# Patient Record
Sex: FEMALE | Race: WHITE | NOT HISPANIC OR LATINO | Employment: FULL TIME | URBAN - METROPOLITAN AREA
[De-identification: names, ages, dates, MRNs, and addresses within clinical notes are randomized per-mention and may not be internally consistent; named-entity substitution may affect disease eponyms.]

---

## 2017-05-17 ENCOUNTER — TELEPHONE (OUTPATIENT)
Dept: NURSING | Facility: CLINIC | Age: 32
End: 2017-05-17

## 2017-05-17 DIAGNOSIS — Z30.41 ENCOUNTER FOR SURVEILLANCE OF CONTRACEPTIVE PILLS: Primary | ICD-10-CM

## 2017-05-17 NOTE — TELEPHONE ENCOUNTER
Received request for refill of Norethindrone 0.35 mg tablet. No record of medication under pt's chart.   To be sent to [facility] pharmacy [id number] [address]. Routed to Анна Ramírez. Please advise

## 2017-05-18 RX ORDER — ACETAMINOPHEN AND CODEINE PHOSPHATE 120; 12 MG/5ML; MG/5ML
1 SOLUTION ORAL DAILY
Qty: 84 TABLET | Refills: 3 | Status: SHIPPED | OUTPATIENT
Start: 2017-05-18 | End: 2023-11-07

## 2023-05-24 ENCOUNTER — LAB REQUISITION (OUTPATIENT)
Dept: LAB | Facility: CLINIC | Age: 38
End: 2023-05-24

## 2023-05-24 DIAGNOSIS — Z01.419 ENCOUNTER FOR GYNECOLOGICAL EXAMINATION (GENERAL) (ROUTINE) WITHOUT ABNORMAL FINDINGS: ICD-10-CM

## 2023-05-24 PROCEDURE — G0145 SCR C/V CYTO,THINLAYER,RESCR: HCPCS | Performed by: OBSTETRICS & GYNECOLOGY

## 2023-05-24 PROCEDURE — 87624 HPV HI-RISK TYP POOLED RSLT: CPT | Performed by: OBSTETRICS & GYNECOLOGY

## 2023-05-30 LAB
BKR LAB AP GYN ADEQUACY: NORMAL
BKR LAB AP GYN INTERPRETATION: NORMAL
BKR LAB AP HPV REFLEX: NORMAL
BKR LAB AP LMP: NORMAL
BKR LAB AP PREVIOUS ABNL DX: NORMAL
BKR LAB AP PREVIOUS ABNORMAL: NORMAL
PATH REPORT.COMMENTS IMP SPEC: NORMAL
PATH REPORT.COMMENTS IMP SPEC: NORMAL
PATH REPORT.RELEVANT HX SPEC: NORMAL

## 2023-06-01 LAB
HUMAN PAPILLOMA VIRUS 16 DNA: NEGATIVE
HUMAN PAPILLOMA VIRUS 18 DNA: NEGATIVE
HUMAN PAPILLOMA VIRUS FINAL DIAGNOSIS: NORMAL
HUMAN PAPILLOMA VIRUS OTHER HR: NEGATIVE

## 2023-09-27 ENCOUNTER — TELEPHONE (OUTPATIENT)
Dept: FAMILY MEDICINE | Facility: CLINIC | Age: 38
End: 2023-09-27
Payer: COMMERCIAL

## 2023-09-27 NOTE — TELEPHONE ENCOUNTER
Patient called having numbness to left side of face for a couple of weeks.  Thought is tooth related. Went to the dentist yesterday and was told it is not teeth related but a nerve related.  Patient reported   - no recent dental work done that required sedation.   - no known injury  - pain affects jaw and left ear    Patient has no primary care provider currently.  Will be seeing Dr. Riggs to establish care with appointment scheduled for 11/7/23.      RN advised go to urgent care to have this issue address today.  Patient agreed.  Urgent care and hours of operation provided at the [place] location.        DERREK Cuellar, RN  [facility]

## 2023-10-17 ENCOUNTER — TELEPHONE (OUTPATIENT)
Dept: NEUROSURGERY | Facility: CLINIC | Age: 38
End: 2023-10-17
Payer: COMMERCIAL

## 2023-10-17 NOTE — TELEPHONE ENCOUNTER
Records Requested     October 17, 2023 1:59 PM   [id number]   Facility  [facility]   Outcome 2:02 pm Sent request for imaging to be pushed to PACS. -CHAN Judge on 10/19/2023 at 8:08 AM Attempted to call & submitted 2nd request for imaging. -CHAN Judge on 10/20/2023 at 8:18 AM Imaging resolved into PACS. -CHAN     RECORDS RECEIVED FROM: Care Everywhere   REASON FOR VISIT: Facial Pain   Date of Appt: 10/20/23 @ 9:00 am    NOTES (FOR ALL VISITS) STATUS DETAILS   OFFICE NOTE from referring provider Internal  10/17/23 (Phone Note)    OFFICE NOTE from other specialist Care Everywhere 10/6/23 Spencer Stephen @[facility]    9/27/23 Eleno Whyte @[facility]    9/27/23 (Phone Appt) Mayra Berman MD @[facility]     MEDICATION LIST Internal     IMAGING  (FOR ALL VISITS)     MRI (HEAD, NECK, SPINE) PACS   10/9/23  MR Brain

## 2023-10-17 NOTE — TELEPHONE ENCOUNTER
Health Call Center    Phone Message    May a detailed message be left on voicemail: yes     Reason for Call: Other: Patient called stating she missed a call to schedule with KEO Vale CNP, she states she has been emailing Dr. Trivedi personally. Patient requesting a call back to schedule. Writer unable to find encounter or instructions. Patient can be reached at [phone].     Action Taken: Message routed to:  [facility] Neurosurgery    Travel Screening: Not Applicable

## 2023-10-20 ENCOUNTER — OFFICE VISIT (OUTPATIENT)
Dept: NEUROSURGERY | Facility: CLINIC | Age: 38
End: 2023-10-20
Payer: COMMERCIAL

## 2023-10-20 ENCOUNTER — PRE VISIT (OUTPATIENT)
Dept: NEUROSURGERY | Facility: CLINIC | Age: 38
End: 2023-10-20

## 2023-10-20 VITALS
RESPIRATION RATE: 16 BRPM | OXYGEN SATURATION: 100 % | HEART RATE: 72 BPM | WEIGHT: 182.4 LBS | SYSTOLIC BLOOD PRESSURE: 134 MMHG | DIASTOLIC BLOOD PRESSURE: 78 MMHG

## 2023-10-20 DIAGNOSIS — G50.0 TRIGEMINAL NEURALGIA: Primary | ICD-10-CM

## 2023-10-20 PROCEDURE — 99203 OFFICE O/P NEW LOW 30 MIN: CPT | Performed by: NURSE PRACTITIONER

## 2023-10-20 RX ORDER — CARBAMAZEPINE 100 MG/1
CAPSULE, EXTENDED RELEASE ORAL
Qty: 60 CAPSULE | Refills: 1 | Status: SHIPPED | OUTPATIENT
Start: 2023-10-20 | End: 2023-11-06

## 2023-10-20 NOTE — LETTER
"10/20/2023       RE: Christelle Bob  [address] [place] [address]       Dear Colleague,    Thank you for referring your patient, Christelle Bob, to the [facility] [place] at [facility]. Please see a copy of my visit note below.    [facility]  Department of Neurosurgery      Name: Christelle Bob  MRN: [id number]  Age: 38 year old  : 1985  Referring provider: No ref. provider found  10/20/2023      Chief Complaint:   Left Facial Pain and numbness  New Patient    History of Present Illness:   Christelle Bob is a 38 year old female with a history of Raynaud's disease, who is here today for further evaluation of left sided facial pain and numbness. She is accompanied by her , Werner.     Per patient, her symptoms acutely started 5 weeks ago. She started with pain in her left cheek area along with some inflammation in gums. Then a few days later, she started with numbness/ \"pressure feeling\" in her left chin, left lower lip and half of her tongue on the left. She was evaluated by a dentist who ruled out dental pathology.     Today, patient reports a \"constant, dull achy pain along with intermittent, sharp, shooting pain on the left V2-3 distribution. Sometimes she feels this pain deep in her ear. No pain with swallowing. Symptoms triggered by cool wind. Numbness is somewhat constant and she reports about 50-75 % reduction in sensation when she touches her left chin, left lower lip. No issues with speech or swallowing. She denies any other neurological symptoms.    She was evaluated in Urgent Care and also by her PCP. She was treated with Valtrex and Prednisone which did not make any changes to her symptoms. Lyme testing was negative. Slightly elevated ESR and a normal CRP.     She denies any known diagnosis of TMJ. No grinding teeth or popping/ clicking with mouth opening and closing. She had Invisalign in " 2020-21 and currently uses a retainer at night.     She has a h/o chicken pox at the age of 4 and Shingles (Left chest area) at the age of 6. Unsure whether she had Shingles vaccine.     No known family h/o MS or previous clinical symptoms concerning for MS relapse.     She completed a brain MRI recently. Please see the results below.       Review of Systems:   Pertinent items are noted in HPI or as in patient entered ROS below, remainder of complete ROS is negative.        No data to display                 Active Medications:     Current Outpatient Medications:     norethindrone (MICRONOR) 0.35 MG per tablet, Take 1 tablet (0.35 mg) by mouth daily, Disp: 84 tablet, Rfl: 3    Prenatal Vit-Fe Fumarate-FA (PRENATAL MULTIVITAMIN  PLUS IRON) 27-0.8 MG TABS, Take 1 tablet by mouth daily, Disp: , Rfl:       Allergies:   Patient has no known allergies.      Past Medical History:  No past medical history on file.  Patient Active Problem List   Diagnosis    Indication for care in labor or delivery    Vaginal abnormality in pregnancy        Past Surgical History:  No past surgical history on file.    Family History:   No family history on file.      Social History:   Social History     Tobacco Use    Smoking status: Never   Substance Use Topics    Alcohol use: No    Drug use: No        Physical Exam:   /78 (BP Location: Right arm, Patient Position: Sitting, Cuff Size: Adult Regular)   Pulse 72   Resp 16   Wt 82.7 kg (182 lb 6.4 oz)   SpO2 100%    General: No acute distress.   Neuro: The patient is fully oriented. Speech is normal. Extraocular movements are intact without nystagmus. Subjective report of reduced sensation to light touch in left V2-3 distribution.  Facial nerve function is normal, rated as a House Brackmann 1. Shoulders are full strength. There is no pronator drift. Full strength in all extremities. Sensation intact throughout. No dysmetria with finger-nose-finger testing. Gait is normal with normal  heel-toe walking.   Psych: Normal mood and affect. Behavior is normal.      Imaging:  10/9/2023 MRI Brain:  IMPRESSION:   1. No abnormality identified along the course of either trigeminal nerve.   2.  Unremarkable head MRI.      Assessment:  Left facial pain and numbness  New Patient    Plan:  We discussed possible diagnosis of Trigeminal neuralgia although her presentation is somewhat atypical. We will start her on Carbamazepine 100 mg daily and gradually increase to 200-300 mg twice daily. Prescription sent to pharmacy. Patient to follow-up with me in 4 weeks. Sooner if she has questions or concerns.     Will review MRI Brain with Dr. Trivedi and will contact the patient.     I spent 35 minutes on patient care activities related to this encounter on the date of service, including time spent reviewing the chart, obtaining history and examination and in counseling the patient, and in documentation in the electronic medical record.          Again, thank you for allowing me to participate in the care of your patient.      Sincerely,    KEO Knight CNP

## 2023-10-20 NOTE — PATIENT INSTRUCTIONS
Start Carbamazepine as instructed.     Follow-up with me in 4 weeks. Sooner if you have questions or concerns.

## 2023-10-20 NOTE — NURSING NOTE
Chief Complaint   Patient presents with    New Patient     Here for consult, confirmed with patient     Lindy Sheehan

## 2023-10-20 NOTE — PROGRESS NOTES
"[facility]  Department of Neurosurgery      Name: Christelle Bob  MRN: [id number]  Age: 38 year old  : 1985  Referring provider: No ref. provider found  10/20/2023      Chief Complaint:   Left Facial Pain and numbness  New Patient    History of Present Illness:   Christelle Bob is a 38 year old female with a history of Raynaud's disease, who is here today for further evaluation of left sided facial pain and numbness. She is accompanied by her , Werner.     Per patient, her symptoms acutely started 5 weeks ago. She started with pain in her left cheek area along with some inflammation in gums. Then a few days later, she started with numbness/ \"pressure feeling\" in her left chin, left lower lip and half of her tongue on the left. She was evaluated by a dentist who ruled out dental pathology.     Today, patient reports a \"constant, dull achy pain along with intermittent, sharp, shooting pain on the left V2-3 distribution. Sometimes she feels this pain deep in her ear. No pain with swallowing. Symptoms triggered by cool wind. Numbness is somewhat constant and she reports about 50-75 % reduction in sensation when she touches her left chin, left lower lip. No issues with speech or swallowing. She denies any other neurological symptoms.    She was evaluated in Urgent Care and also by her PCP. She was treated with Valtrex and Prednisone which did not make any changes to her symptoms. Lyme testing was negative. Slightly elevated ESR and a normal CRP.     She denies any known diagnosis of TMJ. No grinding teeth or popping/ clicking with mouth opening and closing. She had Invisalign in  and currently uses a retainer at night.     She has a h/o chicken pox at the age of 4 and Shingles (Left chest area) at the age of 6. Unsure whether she had Shingles vaccine.     No known family h/o MS or previous clinical symptoms concerning for MS relapse.     She completed a brain MRI recently. Please see " the results below.       Review of Systems:   Pertinent items are noted in HPI or as in patient entered ROS below, remainder of complete ROS is negative.        No data to display                 Active Medications:     Current Outpatient Medications:     norethindrone (MICRONOR) 0.35 MG per tablet, Take 1 tablet (0.35 mg) by mouth daily, Disp: 84 tablet, Rfl: 3    Prenatal Vit-Fe Fumarate-FA (PRENATAL MULTIVITAMIN  PLUS IRON) 27-0.8 MG TABS, Take 1 tablet by mouth daily, Disp: , Rfl:       Allergies:   Patient has no known allergies.      Past Medical History:  No past medical history on file.  Patient Active Problem List   Diagnosis    Indication for care in labor or delivery    Vaginal abnormality in pregnancy        Past Surgical History:  No past surgical history on file.    Family History:   No family history on file.      Social History:   Social History     Tobacco Use    Smoking status: Never   Substance Use Topics    Alcohol use: No    Drug use: No        Physical Exam:   /78 (BP Location: Right arm, Patient Position: Sitting, Cuff Size: Adult Regular)   Pulse 72   Resp 16   Wt 82.7 kg (182 lb 6.4 oz)   SpO2 100%    General: No acute distress.   Neuro: The patient is fully oriented. Speech is normal. Extraocular movements are intact without nystagmus. Subjective report of reduced sensation to light touch in left V2-3 distribution.  Facial nerve function is normal, rated as a House Brackmann 1. Shoulders are full strength. There is no pronator drift. Full strength in all extremities. Sensation intact throughout. No dysmetria with finger-nose-finger testing. Gait is normal with normal heel-toe walking.   Psych: Normal mood and affect. Behavior is normal.      Imaging:  10/9/2023 MRI Brain:  IMPRESSION:   1. No abnormality identified along the course of either trigeminal nerve.   2.  Unremarkable head MRI.      Assessment:  Left facial pain and numbness  New Patient    Plan:  We discussed possible  diagnosis of Trigeminal neuralgia although her presentation is somewhat atypical. We will start her on Carbamazepine 100 mg daily and gradually increase to 200-300 mg twice daily. Prescription sent to pharmacy. Patient to follow-up with me in 4 weeks. Sooner if she has questions or concerns.     Will review MRI Brain with Dr. Trivedi and will contact the patient.     Addendum on 10/24/2023: Discussed and MRI brain was reviewed by Dr. Trivedi. It is not clear whether there is any vascular compression on the current MRI. Recommended to repeat MRI Brain with FIESTA sequence. Order placed. Will update the patient with this recommendation.     Addendum on 11/7/2023: I called the patient to follow-up on her recent [employer] message.  Per patient, rashes are getting better but she has itching.  She continues to be on Benadryl.    She reports improvement in her facial pain while she was taking carbamazepine.  We recommended her to stop this medication due to recent onset of whole body rash, possibly from carbamazepine.  Patient reports increased symptoms after stopping this medication.    Her recent brain MRI was reviewed by Dr. Trivedi.  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  We will arrange a clinic visit with Dr. Trivedi to discuss possible options.    We also discussed starting a trial with gabapentin for ongoing facial pain and numbness.  We will start her on 300 mg daily and will gradually increase to 300 mg 3 times a day.    Sharon Stratton CNP  Department of Neurosurgery    I spent 35 minutes on patient care activities related to this encounter on the date of service, including time spent reviewing the chart, obtaining history and examination and in counseling the patient, and in documentation in the electronic medical record.

## 2023-10-24 ENCOUNTER — TELEPHONE (OUTPATIENT)
Dept: NEUROSURGERY | Facility: CLINIC | Age: 38
End: 2023-10-24
Payer: COMMERCIAL

## 2023-10-31 ENCOUNTER — ANCILLARY PROCEDURE (OUTPATIENT)
Dept: MRI IMAGING | Facility: CLINIC | Age: 38
End: 2023-10-31
Attending: NURSE PRACTITIONER
Payer: COMMERCIAL

## 2023-10-31 DIAGNOSIS — G50.0 TRIGEMINAL NEURALGIA: ICD-10-CM

## 2023-10-31 RX ORDER — GADOBUTROL 604.72 MG/ML
8.3 INJECTION INTRAVENOUS ONCE
Status: COMPLETED | OUTPATIENT
Start: 2023-10-31 | End: 2023-10-31

## 2023-10-31 RX ADMIN — GADOBUTROL 8.3 ML: 604.72 INJECTION INTRAVENOUS at 14:39

## 2023-11-04 ENCOUNTER — MYC MEDICAL ADVICE (OUTPATIENT)
Dept: NEUROSURGERY | Facility: CLINIC | Age: 38
End: 2023-11-04
Payer: COMMERCIAL

## 2023-11-06 ASSESSMENT — ENCOUNTER SYMPTOMS
JOINT SWELLING: 0
ABDOMINAL PAIN: 0
ARTHRALGIAS: 0
COUGH: 0
SORE THROAT: 0
WEAKNESS: 0
HEADACHES: 0
HEMATOCHEZIA: 0
PALPITATIONS: 0
FREQUENCY: 0
MYALGIAS: 0
NERVOUS/ANXIOUS: 0
HEARTBURN: 0
CHILLS: 0
EYE PAIN: 0
SHORTNESS OF BREATH: 0
DIARRHEA: 0
CONSTIPATION: 0
FEVER: 0
PARESTHESIAS: 0
NAUSEA: 1
HEMATURIA: 0
DIZZINESS: 0
BREAST MASS: 0
DYSURIA: 0

## 2023-11-06 NOTE — TELEPHONE ENCOUNTER
Spoke with Christelle.  Patient started to have red, hot hives from neck to toes on 11/4/23 after starting Carbamazepine for facial pain.    Medication was stopped yesterday, 11/5.    Patient denies any facial swelling, mouth, throat or lip swelling, drinking and eating without issues  Patient taking Benadryl for itchy red, hot feeling hives.  Patient has appointment with PCP tomorrow, patient aware any facial or throat swelling to get to ED or 911.    Dr Trivedi to review MRI and patient will be called with results. Voices understanding

## 2023-11-06 NOTE — TELEPHONE ENCOUNTER
Hi,     Please call and check on her. I think it is better to stop Carbamazepine as she has this rash now. Will have to talk to Dr. Trivedi and review her recent MRI.     Thanks,   Sharon

## 2023-11-07 ENCOUNTER — OFFICE VISIT (OUTPATIENT)
Dept: FAMILY MEDICINE | Facility: CLINIC | Age: 38
End: 2023-11-07
Payer: COMMERCIAL

## 2023-11-07 ENCOUNTER — TELEPHONE (OUTPATIENT)
Dept: NEUROSURGERY | Facility: CLINIC | Age: 38
End: 2023-11-07

## 2023-11-07 VITALS
DIASTOLIC BLOOD PRESSURE: 72 MMHG | HEIGHT: 70 IN | HEART RATE: 92 BPM | BODY MASS INDEX: 25.77 KG/M2 | WEIGHT: 180 LBS | RESPIRATION RATE: 16 BRPM | OXYGEN SATURATION: 99 % | TEMPERATURE: 97.2 F | SYSTOLIC BLOOD PRESSURE: 108 MMHG

## 2023-11-07 DIAGNOSIS — Z11.59 NEED FOR HEPATITIS C SCREENING TEST: ICD-10-CM

## 2023-11-07 DIAGNOSIS — G50.0 TRIGEMINAL NEURALGIA: Primary | ICD-10-CM

## 2023-11-07 DIAGNOSIS — Z13.220 LIPID SCREENING: ICD-10-CM

## 2023-11-07 DIAGNOSIS — Z00.00 ROUTINE GENERAL MEDICAL EXAMINATION AT A HEALTH CARE FACILITY: Primary | ICD-10-CM

## 2023-11-07 DIAGNOSIS — Z11.4 SCREENING FOR HIV (HUMAN IMMUNODEFICIENCY VIRUS): ICD-10-CM

## 2023-11-07 LAB
CHOLEST SERPL-MCNC: 175 MG/DL
HCV AB SERPL QL IA: NONREACTIVE
HDLC SERPL-MCNC: 31 MG/DL
HIV 1+2 AB+HIV1 P24 AG SERPL QL IA: NONREACTIVE
LDLC SERPL CALC-MCNC: 113 MG/DL
NONHDLC SERPL-MCNC: 144 MG/DL
TRIGL SERPL-MCNC: 155 MG/DL

## 2023-11-07 PROCEDURE — 36415 COLL VENOUS BLD VENIPUNCTURE: CPT | Performed by: FAMILY MEDICINE

## 2023-11-07 PROCEDURE — 99386 PREV VISIT NEW AGE 40-64: CPT | Performed by: FAMILY MEDICINE

## 2023-11-07 PROCEDURE — 87389 HIV-1 AG W/HIV-1&-2 AB AG IA: CPT | Performed by: FAMILY MEDICINE

## 2023-11-07 PROCEDURE — 80061 LIPID PANEL: CPT | Performed by: FAMILY MEDICINE

## 2023-11-07 PROCEDURE — 86803 HEPATITIS C AB TEST: CPT | Performed by: FAMILY MEDICINE

## 2023-11-07 RX ORDER — GABAPENTIN 300 MG/1
CAPSULE ORAL
Qty: 90 CAPSULE | Refills: 1 | Status: SHIPPED | OUTPATIENT
Start: 2023-11-07

## 2023-11-07 ASSESSMENT — ENCOUNTER SYMPTOMS
FREQUENCY: 0
HEMATOCHEZIA: 0
JOINT SWELLING: 0
WEAKNESS: 0
EYE PAIN: 0
PALPITATIONS: 0
SORE THROAT: 0
MYALGIAS: 0
CHILLS: 0
DYSURIA: 0
COUGH: 0
CONSTIPATION: 0
NERVOUS/ANXIOUS: 0
HEARTBURN: 0
DIZZINESS: 0
ARTHRALGIAS: 0
DIARRHEA: 0
HEMATURIA: 0
ABDOMINAL PAIN: 0
NAUSEA: 1
FEVER: 0
SHORTNESS OF BREATH: 0
HEADACHES: 0
BREAST MASS: 0
PARESTHESIAS: 0

## 2023-11-07 ASSESSMENT — PAIN SCALES - GENERAL: PAINLEVEL: SEVERE PAIN (6)

## 2023-11-07 NOTE — PROGRESS NOTES
SUBJECTIVE:   CC: Christelle is an 38 year old who presents for preventive health visit.       2023     8:24 AM   Additional Questions   Roomed by Asmita HOOD     Healthy Habits:     Getting at least 3 servings of Calcium per day:  Yes    Bi-annual eye exam:  NO    Dental care twice a year:  Yes    Sleep apnea or symptoms of sleep apnea:  None    Diet:  Regular (no restrictions)    Frequency of exercise:  2-3 days/week    Duration of exercise:  15-30 minutes    Taking medications regularly:  Yes    Medication side effects:  Other    Additional concerns today:  Yes    Today's PHQ-2 Score:       2023     7:39 PM   PHQ-2 (  [employer])   Q1: Little interest or pleasure in doing things 1   Q2: Feeling down, depressed or hopeless 1   PHQ-2 Score 2   Q1: Little interest or pleasure in doing things Several days   Q2: Feeling down, depressed or hopeless Several days   PHQ-2 Score 2         Have you ever done Advance Care Planning? (For example, a Health Directive, POLST, or a discussion with a medical provider or your loved ones about your wishes): Yes, patient states has an Advance Care Planning document and will bring a copy to the clinic.    Social History     Tobacco Use    Smoking status: Never    Smokeless tobacco: Never   Substance Use Topics    Alcohol use: No             2023     7:39 PM   Alcohol Use   Prescreen: >3 drinks/day or >7 drinks/week? No          No data to display              Reviewed orders with patient.  Reviewed health maintenance and updated orders accordingly - Yes      Breast Cancer Screenin/6/2023     7:40 PM   Breast CA Risk Assessment (FHS-7)   Do you have a family history of breast, colon, or ovarian cancer? No / Unknown           Pertinent mammograms are reviewed under the imaging tab.    History of abnormal Pap smear: NO - age 30-65 PAP every 5 years with negative HPV co-testing recommended      Latest Ref Rng & Units 2023     9:57 AM   PAP / HPV   PAP  Negative  "for Intraepithelial Lesion or Malignancy (NILM)    HPV 16 DNA Negative Negative    HPV 18 DNA Negative Negative    Other HR HPV Negative Negative      Reviewed and updated as needed this visit by clinical staff   Tobacco  Allergies  Meds    Surg Hx  Fam Hx  Soc Hx        Reviewed and updated as needed this visit by Provider   Tobacco   Meds    Surg Hx  Fam Hx  Soc Hx           Review of Systems   Constitutional:  Negative for chills and fever.   HENT:  Negative for congestion, ear pain, hearing loss and sore throat.    Eyes:  Negative for pain and visual disturbance.   Respiratory:  Negative for cough and shortness of breath.    Cardiovascular:  Negative for chest pain, palpitations and peripheral edema.   Gastrointestinal:  Positive for nausea. Negative for abdominal pain, constipation, diarrhea, heartburn and hematochezia.   Breasts:  Negative for tenderness, breast mass and discharge.   Genitourinary:  Positive for vaginal discharge. Negative for dysuria, frequency, genital sores, hematuria, pelvic pain, urgency and vaginal bleeding.   Musculoskeletal:  Negative for arthralgias, joint swelling and myalgias.   Skin:  Positive for rash.   Neurological:  Negative for dizziness, weakness, headaches and paresthesias.   Psychiatric/Behavioral:  Negative for mood changes. The patient is not nervous/anxious.           OBJECTIVE:   /72 (BP Location: Right arm, Patient Position: Sitting, Cuff Size: Adult Regular)   Pulse 92   Temp 97.2  F (36.2  C) (Temporal)   Resp 16   Ht 1.778 m (5' 10\")   Wt 81.6 kg (180 lb)   LMP 10/18/2023 (Exact Date)   SpO2 99%   Breastfeeding No   BMI 25.83 kg/m    Physical Exam  GENERAL: healthy, alert and no distress  EYES: Eyes grossly normal to inspection, PERRL and conjunctivae and sclerae normal  HENT: nose and mouth without ulcers or lesions  NECK: no adenopathy, no asymmetry, masses, or scars and thyroid normal to palpation  RESP: lungs clear to auscultation - no " "rales, rhonchi or wheezes  CV: regular rate and rhythm, normal S1 S2, no S3 or S4, no murmur, click or rub, no peripheral edema and peripheral pulses strong  ABDOMEN: soft, nontender, no hepatosplenomegaly, no masses and bowel sounds normal  MS: no gross musculoskeletal defects noted, no edema  SKIN: no suspicious lesions  NEURO: Normal strength and tone, mentation intact and speech normal  PSYCH: mentation appears normal, affect normal/bright        ASSESSMENT/PLAN:   Christelle was seen today for physical and establish care.    Diagnoses and all orders for this visit:    Routine general medical examination at a health care facility  -     PRIMARY CARE FOLLOW-UP SCHEDULING; Future    Lipid screening  -     Lipid panel reflex to direct LDL Non-fasting    Screening for HIV (human immunodeficiency virus)  -     HIV Antigen Antibody Combo    Need for hepatitis C screening test  -     Hepatitis C Screen Reflex to HCV RNA Quant and Genotype              COUNSELING:  Reviewed preventive health counseling, as reflected in patient instructions       Regular exercise       Healthy diet/nutrition      BMI:   Estimated body mass index is 25.83 kg/m  as calculated from the following:    Height as of this encounter: 1.778 m (5' 10\").    Weight as of this encounter: 81.6 kg (180 lb).         She reports that she has never smoked. She has never used smokeless tobacco.    Moe Riggs DO  [facility]  "

## 2023-11-07 NOTE — TELEPHONE ENCOUNTER
I called the patient to follow-up on her recent [email or web address] message.  Per patient, rashes are getting better but she has itching.  She continues to be on Benadryl.    She reports improvement in her facial pain while she was taking carbamazepine.  We recommended her to stop this medication due to recent onset of whole body rash, possibly from carbamazepine.  Patient reports increased symptoms after stopping this medication.    Her recent brain MRI was reviewed by Dr. Trivedi.  There is neurovascular compression by a loop of SCA and round left-sided trigeminal nerve.  We will arrange a clinic visit with Dr. Trivedi to discuss possible options.    We also discussed starting a trial with gabapentin for ongoing symptoms.  We will start her on 300 mg daily and will gradually increase to 300 mg 3 times a day.

## 2023-11-08 ENCOUNTER — TELEPHONE (OUTPATIENT)
Dept: NEUROSURGERY | Facility: CLINIC | Age: 38
End: 2023-11-08
Payer: COMMERCIAL

## 2023-11-08 NOTE — TELEPHONE ENCOUNTER
RECORDS RECEIVED FROM: Care Everywhere   REASON FOR VISIT: Trigeminal Neuralgia   Date of Appt: 11/14/23 @ 3:00 pm    NOTES (FOR ALL VISITS) STATUS DETAILS   OFFICE NOTE from referring provider Internal 10/20/23, 10/17/23 (Phone Note) Sharon Stratton APRN CNP @[facility]-NeuroSurg     OFFICE NOTE from other specialist Care Everywhere 10/6/23 Spencer Stephen @[facility]     9/27/23 Eleno Whyte @[facility]     9/27/23 (Phone Appt) Mayra Berman MD @[facility]      MEDICATION LIST Internal    IMAGING  (FOR ALL VISITS)     MRI (HEAD, NECK, SPINE) Internal [facility]  10/31/23 MR Brain      10/9/23  MR Brain

## 2023-11-13 NOTE — TELEPHONE ENCOUNTER
Attn: JOSE Mosquera   *Patient reports rash, photos attached. States from medication     Mayra Storey LPN  Neurosurgery

## 2023-11-14 ENCOUNTER — VIRTUAL VISIT (OUTPATIENT)
Dept: NEUROSURGERY | Facility: CLINIC | Age: 38
End: 2023-11-14
Payer: COMMERCIAL

## 2023-11-14 ENCOUNTER — PRE VISIT (OUTPATIENT)
Dept: NEUROSURGERY | Facility: CLINIC | Age: 38
End: 2023-11-14

## 2023-11-14 DIAGNOSIS — G50.0 TRIGEMINAL NEURALGIA: Primary | ICD-10-CM

## 2023-11-14 PROCEDURE — 99214 OFFICE O/P EST MOD 30 MIN: CPT | Mod: 95 | Performed by: NEUROLOGICAL SURGERY

## 2023-11-14 ASSESSMENT — PAIN SCALES - GENERAL: PAINLEVEL: MODERATE PAIN (4)

## 2023-11-14 NOTE — PROGRESS NOTES
Virtual Visit Details    Type of service:  Video Visit     I had a very nice call today with Colleen and her  Werner.  They gave consent for this visit.    Colleen is a 38-year-old woman from [place] who works at [employer].  She was referred to me from a family friend due to her facial pain.  She has been seen and managed in our clinic.  We have diagnosed her with left-sided trigeminal neuralgia.  She was started on Tegretol and this was very effective for her pain however she developed a rash.  She was then transitioned to gabapentin and interestingly enough developed a rash with that as well.    Today I talked her about the history of her pain.  This pain came on earlier this year and was associated with numbness and tingling.  She describes sharp shooting electrical pain that she describes as zingers.  There is a constant dull achy component of the pain that is present almost all the time.  She says the Tegretol was beneficial for both components of the pain.    We obtained a high-resolution MRI which I reviewed with her today.  I do see a loop of the superior cerebellar artery that is in contact with the left trigeminal nerve.    Overall I do think that she is having trigeminal neuralgia.  This is supported by the sharp shooting electrical nature of the pain, unilateral, triggered by cold, response to Tegretol.  It is a little unusual to have the numbness and tingling which is associated with her pain.  Because of this it does cause me to pause and ensure that this is trigeminal neuralgia before we proceed with any surgical interventions.    I also found it interesting that she had a rash with gabapentin.  Usually I have not encountered this.  I have asked and master in my office to reach out to Maria Luisa Barone to ask Maria Luisa to call Marisol and to talk to her about the medications.  I am curious if Maria Luisa can help us identify a medication that works well without any side effects.    Before  proceeding with any surgical interventions I would like Colleen to be seen in our multidisciplinary facial pain clinic.    Today I spelled out what I see as our path moving forward.  She will be seen in our multidisciplinary clinic.  I also hope that we can get her on a medication in the meantime.  If the medications are not helping and my colleagues do agree that this is trigeminal neuralgia I would then consider a microvascular decompression over any of the ablative procedures.  Colleen and her  Werner asked a number of good questions and seemed to understand the situation well and are comfortable with our plan.

## 2023-11-14 NOTE — NURSING NOTE
Is the patient currently in the state of [place]? YES    Visit mode:VIDEO    If the visit is dropped, the patient can be reconnected by: VIDEO VISIT: Text to cell phone:   Telephone Information:   Mobile [phone]    and VIDEO VISIT: Send to e-mail at: [email or web address]    Will anyone else be joining the visit? YES: How would they like to receive their invitation? Send to e-mail: [email or web address]  (If patient encounters technical issues they should call [phone])    How would you like to obtain your AVS? MyChart    Are changes needed to the allergy or medication list? Yes gabapentin allergic reaction: hives    Reason for visit: Consult    Amanda ROBINS

## 2023-11-14 NOTE — LETTER
11/14/2023       RE: Christelle Bob  [address]  [place] [address]     Dear Colleague,    Thank you for referring your patient, Christelle Bob, to the [facility] NEUROSURGERY CLINIC [place] at [facility]. Please see a copy of my visit note below.    Virtual Visit Details    Type of service:  Video Visit     I had a very nice call today with Colleen and her  Werner.  They gave consent for this visit.    Colleen is a 38-year-old woman from [place] who works at [employer].  She was referred to me from a family friend due to her facial pain.  She has been seen and managed in our clinic.  We have diagnosed her with left-sided trigeminal neuralgia.  She was started on Tegretol and this was very effective for her pain however she developed a rash.  She was then transitioned to gabapentin and interestingly enough developed a rash with that as well.    Today I talked her about the history of her pain.  This pain came on earlier this year and was associated with numbness and tingling.  She describes sharp shooting electrical pain that she describes as zingers.  There is a constant dull achy component of the pain that is present almost all the time.  She says the Tegretol was beneficial for both components of the pain.    We obtained a high-resolution MRI which I reviewed with her today.  I do see a loop of the superior cerebellar artery that is in contact with the left trigeminal nerve.    Overall I do think that she is having trigeminal neuralgia.  This is supported by the sharp shooting electrical nature of the pain, unilateral, triggered by cold, response to Tegretol.  It is a little unusual to have the numbness and tingling which is associated with her pain.  Because of this it does cause me to pause and ensure that this is trigeminal neuralgia before we proceed with any surgical interventions.    I also found it interesting that she had a  rash with gabapentin.  Usually I have not encountered this.  I have asked and master in my office to reach out to Maria Luisa Abisai to ask Maria Luisa to call Marisol and to talk to her about the medications.  I am curious if Maria Luisa can help us identify a medication that works well without any side effects.    Before proceeding with any surgical interventions I would like Colleen to be seen in our multidisciplinary facial pain clinic.    Today I spelled out what I see as our path moving forward.  She will be seen in our multidisciplinary clinic.  I also hope that we can get her on a medication in the meantime.  If the medications are not helping and my colleagues do agree that this is trigeminal neuralgia I would then consider a microvascular decompression over any of the ablative procedures.  Colleen and her  Werner asked a number of good questions and seemed to understand the situation well and are comfortable with our plan.        Again, thank you for allowing me to participate in the care of your patient.      Sincerely,    Jered Trivedi MD

## 2023-11-15 ENCOUNTER — TELEPHONE (OUTPATIENT)
Dept: OTOLARYNGOLOGY | Facility: CLINIC | Age: 38
End: 2023-11-15
Payer: COMMERCIAL

## 2023-11-15 NOTE — PATIENT INSTRUCTIONS
Referral to the Complex Facial Pain Clinic.  Clinic is over Zoom this Monday late afternoon, you will be called with an appointment time/date.    Referral to Annette Barone, Pharmacy to discuss medications/rash reactions.    Follow up with Dr Trivedi in 4 weeks in clinic.    Call Charline MENJIVAR  Neurosurgery Care Coordinator with questions/concerns     Thank you for using [employer]

## 2023-11-15 NOTE — TELEPHONE ENCOUNTER
"[facility], referred by cm Alvarez to overbook per Charline, Patient confirmed virtual Zoom visit, date, and time. Patient stated she would have zoom downloaded and up to date prior to visit. Please send link to \"[email or web address]\" . Shanna 11/15   "

## 2023-11-20 ENCOUNTER — VIRTUAL VISIT (OUTPATIENT)
Dept: OTOLARYNGOLOGY | Facility: CLINIC | Age: 38
End: 2023-11-20
Payer: COMMERCIAL

## 2023-11-20 DIAGNOSIS — R51.9 FACIAL PAIN: Primary | ICD-10-CM

## 2023-11-20 PROCEDURE — 99214 OFFICE O/P EST MOD 30 MIN: CPT | Mod: 95 | Performed by: NEUROLOGICAL SURGERY

## 2023-11-20 RX ORDER — DULOXETIN HYDROCHLORIDE 30 MG/1
CAPSULE, DELAYED RELEASE ORAL
Qty: 30 CAPSULE | Refills: 1 | Status: SHIPPED | OUTPATIENT
Start: 2023-11-20 | End: 2023-12-20

## 2023-11-20 ASSESSMENT — PAIN SCALES - GENERAL
PAINLEVEL: MODERATE PAIN (5)

## 2023-11-20 NOTE — NURSING NOTE
Is the patient currently in the state of [place]? YES    Visit mode:VIDEO    If the visit is dropped, the patient can be reconnected by:  Zoom link via email    Will anyone else be joining the visit? NO  (If patient encounters technical issues they should call [phone]    How would you like to obtain your AVS? MyChart    Are changes needed to the allergy or medication list? No, Pt stated no changes to allergies, and Pt stated no med changes    Reason for visit: Consult    Argenis ROBINS

## 2023-11-20 NOTE — PROGRESS NOTES
Virtual Visit Details    Type of service:  Video Visit     Originating Location (pt. Location): Home    Distant Location (provider location):  On-site  Platform used for Video Visit: Zoom (Telehealth)    She endorses having pressure in the left chin, lower half of face left and left tongue and at times pain in deep ear and no pain with swallowing (LEFT cheek pain to the half of the chin). Her tongue is half numb, numbness is permanent; pain was there a week, and after she saw dentisty and felt like the area was recovering after novocaine.  She does describe zingers of pain that come and go, and the last 2 days 5-10 episodes, last Friday was 30. She endorses that cold temperature is a trigger for pain, not with gusts of wind. There is also a  dull constant pain 3-4 always there.    She did develop a rash after a week or so with carbamazepine - oct 23-nov 3, did make pain better  rash with gabapentin - never made it up to the full dose of the gabapentin, noticed no difference  A blood vessel loop was present on MRI - Dr. Trivedi referral    Began after tonsils removed and Dental related? - dentistry did not think it was related   She has a hx of 1 root canal  [facility] Valtrex, thinking related to dormant shingles?   First MRI was negative and referred to neurology,   connected to Dr. Trivedi and referred to Sharon  Friday 3rd started on carbamazepine and went to [facility], and next day covered in hives, so stopped  THEN started on gabapentin the next day hives from the chest down   Dr. Trivedi recommended to see us    Assessment and Plan:  The history of chin numbness is most consistent with a trigeminal neuropathy and the evaluation will be directed towards these etiologies.     Autoimmune labs (KELL, CIELO, Sjogren's panel, RF), consider Lip biopsy for Sjogren's through ENT  Face MRI (NUMB chin syndrome), evaluate for a neoplasm or lesion that could explain left CN V3 symptomatology  For  pain, start with duloxetine and will follow with Sharon Stratton.

## 2023-11-20 NOTE — LETTER
11/20/2023       RE: Christelle Bob  [address] [place] [address]     Dear Colleague,    Thank you for referring your patient, Christelle Bob, to the [facility] EAR NOSE AND THROAT CLINIC [place] at [facility]. Please see a copy of my visit note below.      She endorses having pressure in the left chin, lower half of face left and left tongue and at times pain in deep ear and no pain with swallowing (LEFT cheek pain to the half of the chin). Her tongue is half numb, numbness is permanent; pain was there a week, and after she saw dentisty and felt like the area was recovering after novocaine.  She does describe zingers of pain that come and go, and the last 2 days 5-10 episodes, last Friday was 30. She endorses that cold temperature is a trigger for pain, not with gusts of wind. There is also a  dull constant pain 3-4 always there.    She did develop a rash after a week or so with carbamazepine - oct 23-nov 3, did make pain better  rash with gabapentin - never made it up to the full dose of the gabapentin, noticed no difference  A blood vessel loop was present on MRI - Dr. Trivedi referral    Began after tonsils removed and Dental related? - dentistry did not think it was related   She has a hx of 1 root canal  [facility] Valtrex, thinking related to dormant shingles?   First MRI was negative and referred to neurology,   connected to Dr. Trivedi and referred to Sharon  Friday 3rd started on carbamazepine and went to [facility], and next day covered in hives, so stopped  THEN started on gabapentin the next day hives from the chest down   Dr. Trivedi recommended to see us    Assessment and Plan:  The history of chin numbness is most consistent with a trigeminal neuropathy and the evaluation will be directed towards these etiologies.     Autoimmune labs (KELL, CIELO, Sjogren's panel, RF), consider Lip biopsy for Sjogren's through  ENT  Face MRI (NUMB chin syndrome), evaluate for a neoplasm or lesion that could explain left CN V3 symptomatology  For pain, start with duloxetine and will follow with Sharon Stratton.       Again, thank you for allowing me to participate in the care of your patient.      Sincerely,    Vivi Gilmore MD

## 2023-11-20 NOTE — NURSING NOTE
Is the patient currently in the state of [place]? YES    Visit mode:VIDEO    If the visit is dropped, the patient can be reconnected by:  Zoom link via email    Will anyone else be joining the visit? NO  (If patient encounters technical issues they should call [phone])    How would you like to obtain your AVS? MyChart    Are changes needed to the allergy or medication list? No, Pt stated no changes to allergies, and Pt stated no med changes    Reason for visit: Consult    Argenis ROBINS

## 2023-11-20 NOTE — PROGRESS NOTES
Virtual Visit Details    Type of service:  Video Visit     I saw Christelle in our multidisciplinary clinic.  Our group felt that her pain is most likely neuropathic and not trigeminal neuralgia.  As such our recommendation was to trial different neuropathic medications.  She will work with Aliza Barone and Sharon Stratton on this.

## 2023-11-20 NOTE — LETTER
11/20/2023       RE: Christelle Bob  [address] [place] [address]     Dear Colleague,    Thank you for referring your patient, Christelle Bob, to the [facility] EAR NOSE AND THROAT CLINIC [place] at [facility]. Please see a copy of my visit note below.        Comprehensive Facial Pain Clinic Note      This patient was seen in a multidisciplinary clinic between Neurosurgery, Neurology, Dentistry, Pharmacy, and ENT. Please refer to all notes from the encounter, which are written by several providers.    History of Present Illness  Christelle Bob is a 38 year old woman  Facial pain in mid September. Thought it was dental related but started having numbness and tingling/burning. She saw her dentist who did not think it was dental related. The pain was moving around the mouth and moving into her cheek bones. She went to an urgent care and the physician at  and started valtrex and a steroid. She got an MRI in October that was reported as negative.  She was then referred to Dr. Trivedi.  Started carbemazepine on 10/23 and on 11/3, she noticed a rash that turned into hives. She stopped the tegretol. She then started gabapentin but developed hives again. She has not been on a medication since then due to the rash. She believes the tegretol did improve it transiently.    Left pain from the cheek bone down to the chin to the midline includes tongue and sometimes the roof of the mouth. Sometimes the pain goes into the ear. Small amount of numbness in the chin. The tongue tingles some of the day. The zingers come and go but the numbness is there all the time.     Triggers: cold but not gusts of wind. Also she can touch her face.   Cold weather makes it burn.        Allergies   Allergen Reactions    Carbamazepine Hives     11/16/23 Hives from neck to toes, itchy red hot.        Current Outpatient Medications   Medication    gabapentin (NEURONTIN) 300 MG capsule      No current facility-administered medications for this visit.       ROS: 10 point ROS neg other than the symptoms noted above in the HPI.        Imaging: my interpretations only  MRI skull base: 10/31/2023: with and without contrast: loop of the SCA abuts the left trigeminal nerve distally         Assessment and Plan   Christelle Bob is a 38 year old female with facial pain that has features of of trigeminal neuralgia and trigeminal neuropathy. Given that this is early in a course of possible trigeminal neuralgia or neuropathy, we will take a conservative medical approach and start with an SNRI and possibly add a small and titrating dose of pregabalin. The differential still remains large and further work up is warranted. She does have a remote history of shingles and we will proceed with autoimmune workup.      Autoimmune panel  Cymbalta  Eventually pregabalin      Walt Erickson MD  Department of Neurosurgery  [facility]

## 2023-11-20 NOTE — PROGRESS NOTES
Virtual Visit Details    Type of service:  Video Visit     Originating Location (pt. Location): Home    Distant Location (provider location):  Off-site  Platform used for Video Visit: Zoom (Telehealth)      Comprehensive Facial Pain Clinic Note      This patient was seen in a multidisciplinary clinic between Neurosurgery, Neurology, Dentistry, Pharmacy, and ENT. Please refer to all notes from the encounter, which are written by several providers.    History of Present Illness  Christelle Bob is a 38 year old woman  Facial pain in mid September. Thought it was dental related but started having numbness and tingling/burning. She saw her dentist who did not think it was dental related. The pain was moving around the mouth and moving into her cheek bones. She went to an urgent care and the physician at  and started valtrex and a steroid. She got an MRI in October that was reported as negative.  She was then referred to Dr. Trivedi.  Started carbemazepine on 10/23 and on 11/3, she noticed a rash that turned into hives. She stopped the tegretol. She then started gabapentin but developed hives again. She has not been on a medication since then due to the rash. She believes the tegretol did improve it transiently.    Left pain from the cheek bone down to the chin to the midline includes tongue and sometimes the roof of the mouth. Sometimes the pain goes into the ear. Small amount of numbness in the chin. The tongue tingles some of the day. The zingers come and go but the numbness is there all the time.     Triggers: cold but not gusts of wind. Also she can touch her face.   Cold weather makes it burn.        Allergies   Allergen Reactions    Carbamazepine Hives     11/16/23 Hives from neck to toes, itchy red hot.        Current Outpatient Medications   Medication    gabapentin (NEURONTIN) 300 MG capsule     No current facility-administered medications for this visit.       ROS: 10 point ROS neg other than the symptoms  noted above in the HPI.        Imaging: my interpretations only  MRI skull base: 10/31/2023: with and without contrast: loop of the SCA abuts the left trigeminal nerve distally         Assessment and Plan   Christelle Bob is a 38 year old female with facial pain that has features of of trigeminal neuralgia and trigeminal neuropathy. Given that this is early in a course of possible trigeminal neuralgia or neuropathy, we will take a conservative medical approach and start with an SNRI and possibly add a small and titrating dose of pregabalin. The differential still remains large and further work up is warranted. She does have a remote history of shingles and we will proceed with autoimmune workup.      Autoimmune panel  Cymbalta  Eventually pregabalin      Walt Erickson MD  Department of Neurosurgery  [facility]

## 2023-11-20 NOTE — NURSING NOTE
Is the patient currently in the state of [place]? YES    Visit mode:VIDEO    If the visit is dropped, the patient can be reconnected by:  Zoom link via email    Will anyone else be joining the visit? NO  (If patient encounters technical issues they should call [phone])    How would you like to obtain your AVS? MyChart    Are changes needed to the allergy or medication list? No, Pt stated no changes to allergies, and Pt stated no med changes    Reason for visit: Consult    Argenis ROBINS

## 2023-11-20 NOTE — PROGRESS NOTES
"Virtual Visit Details    Type of service:  Video Visit     Originating Location (pt. Location): {video visit patient location:763470::\"Home\"}  {PROVIDER LOCATION On-site should be selected for visits conducted from your clinic location or adjoining [facility] hospital, academic office, or other nearby [facility] building. Off-site should be selected for all other provider locations, including home:526569}  Distant Location (provider location):  {virtual location provider:781765}  Platform used for Video Visit: {Virtual Visit Platforms:794240::\"[employer]\"}  "

## 2023-11-20 NOTE — NURSING NOTE
Is the patient currently in the state of [place]? YES    Visit mode:VIDEO    If the visit is dropped, the patient can be reconnected by:  Zoom link via email    Will anyone else be joining the visit? NO  (If patient encounters technical issues they should call [phone]    How would you like to obtain your AVS? MyChart    Are changes needed to the allergy or medication list? No, Pt stated no changes to allergies, and Pt stated no med changes    Reason for visit: Consult    Argenis ROBINS

## 2023-11-20 NOTE — LETTER
11/20/2023       RE: Christelle Bob  [address] [place] [address]     Dear Colleague,    Thank you for referring your patient, Christelle Bob, to the [facility] EAR NOSE AND THROAT CLINIC [place] at [facility]. Please see a copy of my visit note below.    Virtual Visit Details    Type of service:  Video Visit     I saw Christelle in our multidisciplinary clinic.  Our group felt that her pain is most likely neuropathic and not trigeminal neuralgia.  As such our recommendation was to trial different neuropathic medications.  She will work with Aliza Barone and Sharon Stratton on this.      Again, thank you for allowing me to participate in the care of your patient.      Sincerely,    Jered Trivedi MD

## 2023-11-22 ENCOUNTER — TELEPHONE (OUTPATIENT)
Dept: NEUROSURGERY | Facility: CLINIC | Age: 38
End: 2023-11-22
Payer: COMMERCIAL

## 2023-12-12 ENCOUNTER — OFFICE VISIT (OUTPATIENT)
Dept: NEUROSURGERY | Facility: CLINIC | Age: 38
End: 2023-12-12
Payer: COMMERCIAL

## 2023-12-12 ENCOUNTER — LAB (OUTPATIENT)
Dept: LAB | Facility: CLINIC | Age: 38
End: 2023-12-12
Payer: COMMERCIAL

## 2023-12-12 VITALS
WEIGHT: 141.1 LBS | HEART RATE: 90 BPM | BODY MASS INDEX: 20.25 KG/M2 | SYSTOLIC BLOOD PRESSURE: 138 MMHG | DIASTOLIC BLOOD PRESSURE: 83 MMHG | OXYGEN SATURATION: 99 %

## 2023-12-12 DIAGNOSIS — R51.9 FACIAL PAIN: ICD-10-CM

## 2023-12-12 DIAGNOSIS — G50.0 TRIGEMINAL NEURALGIA: Primary | ICD-10-CM

## 2023-12-12 LAB
ALBUMIN SERPL BCG-MCNC: 4.5 G/DL (ref 3.5–5.2)
ALP SERPL-CCNC: 55 U/L (ref 40–150)
ALT SERPL W P-5'-P-CCNC: 21 U/L (ref 0–50)
ANION GAP SERPL CALCULATED.3IONS-SCNC: 11 MMOL/L (ref 7–15)
AST SERPL W P-5'-P-CCNC: 25 U/L (ref 0–45)
BASOPHILS # BLD AUTO: 0.1 10E3/UL (ref 0–0.2)
BASOPHILS NFR BLD AUTO: 1 %
BILIRUB SERPL-MCNC: 0.5 MG/DL
BUN SERPL-MCNC: 11.3 MG/DL (ref 6–20)
CALCIUM SERPL-MCNC: 9.7 MG/DL (ref 8.6–10)
CHLORIDE SERPL-SCNC: 100 MMOL/L (ref 98–107)
CREAT SERPL-MCNC: 0.78 MG/DL (ref 0.51–0.95)
DEPRECATED HCO3 PLAS-SCNC: 26 MMOL/L (ref 22–29)
EGFRCR SERPLBLD CKD-EPI 2021: >90 ML/MIN/1.73M2
EOSINOPHIL # BLD AUTO: 0.3 10E3/UL (ref 0–0.7)
EOSINOPHIL NFR BLD AUTO: 4 %
ERYTHROCYTE [DISTWIDTH] IN BLOOD BY AUTOMATED COUNT: 12.7 % (ref 10–15)
ERYTHROCYTE [SEDIMENTATION RATE] IN BLOOD BY WESTERGREN METHOD: 16 MM/HR (ref 0–20)
GLUCOSE SERPL-MCNC: 120 MG/DL (ref 70–99)
HCT VFR BLD AUTO: 37.3 % (ref 35–47)
HGB BLD-MCNC: 12.3 G/DL (ref 11.7–15.7)
IMM GRANULOCYTES # BLD: 0 10E3/UL
IMM GRANULOCYTES NFR BLD: 0 %
LYMPHOCYTES # BLD AUTO: 1.5 10E3/UL (ref 0.8–5.3)
LYMPHOCYTES NFR BLD AUTO: 21 %
MCH RBC QN AUTO: 28.4 PG (ref 26.5–33)
MCHC RBC AUTO-ENTMCNC: 33 G/DL (ref 31.5–36.5)
MCV RBC AUTO: 86 FL (ref 78–100)
MONOCYTES # BLD AUTO: 0.7 10E3/UL (ref 0–1.3)
MONOCYTES NFR BLD AUTO: 10 %
NEUTROPHILS # BLD AUTO: 4.4 10E3/UL (ref 1.6–8.3)
NEUTROPHILS NFR BLD AUTO: 64 %
NRBC # BLD AUTO: 0 10E3/UL
NRBC BLD AUTO-RTO: 0 /100
PLATELET # BLD AUTO: 409 10E3/UL (ref 150–450)
POTASSIUM SERPL-SCNC: 3.8 MMOL/L (ref 3.4–5.3)
PROT SERPL-MCNC: 8 G/DL (ref 6.4–8.3)
RBC # BLD AUTO: 4.33 10E6/UL (ref 3.8–5.2)
SODIUM SERPL-SCNC: 137 MMOL/L (ref 135–145)
WBC # BLD AUTO: 7 10E3/UL (ref 4–11)

## 2023-12-12 PROCEDURE — 99000 SPECIMEN HANDLING OFFICE-LAB: CPT | Performed by: PATHOLOGY

## 2023-12-12 PROCEDURE — 36415 COLL VENOUS BLD VENIPUNCTURE: CPT | Performed by: PATHOLOGY

## 2023-12-12 PROCEDURE — 86431 RHEUMATOID FACTOR QUANT: CPT | Performed by: NEUROLOGICAL SURGERY

## 2023-12-12 PROCEDURE — 80053 COMPREHEN METABOLIC PANEL: CPT | Performed by: PATHOLOGY

## 2023-12-12 PROCEDURE — 99212 OFFICE O/P EST SF 10 MIN: CPT | Performed by: NURSE PRACTITIONER

## 2023-12-12 PROCEDURE — 86038 ANTINUCLEAR ANTIBODIES: CPT | Performed by: NEUROLOGICAL SURGERY

## 2023-12-12 PROCEDURE — 85652 RBC SED RATE AUTOMATED: CPT | Performed by: PATHOLOGY

## 2023-12-12 PROCEDURE — 85025 COMPLETE CBC W/AUTO DIFF WBC: CPT | Performed by: PATHOLOGY

## 2023-12-12 ASSESSMENT — PAIN SCALES - GENERAL: PAINLEVEL: MODERATE PAIN (5)

## 2023-12-12 NOTE — PROGRESS NOTES
"[facility]  Department of Neurosurgery      Name: Christelle Bob  MRN: [id number]  Age: 38 year old  : 1985  Referring provider: No ref. provider found  2023      Chief Complaint:   Left Facial pain and numbness  Follow-up     History of Present Illness:   Christelle Bob is a 38 year old female with a history of left facial pain and numbness who is seen today for a follow up. She started left sided facial pain in . Please see my initial clinic note from 10/20/2023 for additional details. Patient reported a combination of \"constant, dull achy pain along with intermittent, sharp, shooting pain on the left V2-3 distribution. Sometimes she feels this pain deep in her ear. No pain with swallowing. Symptoms triggered by cool wind. Numbness is somewhat constant and she reports about 50-75 % reduction in sensation when she touches her left chin, left lower lip.     Initial visit with me on 10/202/2023. Since then, she tried and failed Carbamazepine (had a rash/ hives) and gabapentin (hives). She was seen in [facility] on 2023. Consensus was that her symptoms are consistent with both trigeminal neuralgia and neuropathy. She was started on Cymbalta. Also recommended auto immune panel.     Today, patient presents for a follow-up. She started Cymbalta on  and has been on 60 mg daily since . She reports some improvement in constant, dull, achy pain but no changes in intermittent, sharp, shooting pain. She reports at least 3-16 episodes through out the day. Left facial numbness remain the same. No major side effects from this medication. Auto immune panel pending.       Review of Systems:   Pertinent items are noted in HPI or as in patient entered ROS below, remainder of complete ROS is negative.       11/15/2023     3:27 PM    ENT ROS   Neurology Numbness   Allergy/Immunology Rash   Other Rash        Physical Exam:   /83 (BP Location: " Right arm, Patient Position: Sitting, Cuff Size: Adult Regular)   Pulse 90   Wt 64 kg (141 lb 1.6 oz)   LMP 10/18/2023 (Exact Date)   SpO2 99%   BMI 20.25 kg/m     General: No acute distress.    Neuro: The patient is fully oriented. Speech is normal. Gait is normal.  Psych: Normal mood and affect. Behavior is normal.      Assessment:  Left Facial pain and numbness  Follow-up    Plan:  Patient is currently on Cymbalta 60 mg daily. She reports some improvement in constant, dull, achy pain without any difference in intermittent, sharp, shooting pain and numbness on the left side of her face. She will complete the labs today. Will follow-up with [facility] team and will contact the patient with recommendations.          I spent 15 minutes on patient care activities related to this encounter on the date of service, including time spent reviewing the chart, obtaining history and examination and in counseling the patient, and in documentation in the electronic medical record.      Sharon CROWLEY CNP  Department of Neurosurgery

## 2023-12-12 NOTE — LETTER
"2023       RE: Christelle Bob  [address] [place] [address]       Dear Colleague,    Thank you for referring your patient, Christelle Bob, to the [facility] NEUROSURGERY CLINIC [place] at [facility]. Please see a copy of my visit note below.    [facility]  Department of Neurosurgery      Name: Christelle Bob  MRN: [id number]  Age: 38 year old  : 1985  Referring provider: No ref. provider found  2023      Chief Complaint:   Left Facial pain and numbness  Follow-up     History of Present Illness:   Christelle Bob is a 38 year old female with a history of left facial pain and numbness who is seen today for a follow up. She started left sided facial pain in . Please see my initial clinic note from 10/20/2023 for additional details. Patient reported a combination of \"constant, dull achy pain along with intermittent, sharp, shooting pain on the left V2-3 distribution. Sometimes she feels this pain deep in her ear. No pain with swallowing. Symptoms triggered by cool wind. Numbness is somewhat constant and she reports about 50-75 % reduction in sensation when she touches her left chin, left lower lip.     Initial visit with me on 10/202/2023. Since then, she tried and failed Carbamazepine (had a rash/ hives) and gabapentin (hives). She was seen in [facility] on 2023. Consensus was that her symptoms are consistent with both trigeminal neuralgia and neuropathy. She was started on Cymbalta. Also recommended auto immune panel.     Today, patient presents for a follow-up. She started Cymbalta on  and has been on 60 mg daily since . She reports some improvement in constant, dull, achy pain but no changes in intermittent, sharp, shooting pain. She reports at least 3-16 episodes through out the day. Left facial numbness remain the same. No major side effects from this " medication. Auto immune panel pending.       Review of Systems:   Pertinent items are noted in HPI or as in patient entered ROS below, remainder of complete ROS is negative.       11/15/2023     3:27 PM    ENT ROS   Neurology Numbness   Allergy/Immunology Rash   Other Rash        Physical Exam:   /83 (BP Location: Right arm, Patient Position: Sitting, Cuff Size: Adult Regular)   Pulse 90   Wt 64 kg (141 lb 1.6 oz)   LMP 10/18/2023 (Exact Date)   SpO2 99%   BMI 20.25 kg/m     General: No acute distress.    Neuro: The patient is fully oriented. Speech is normal. Gait is normal.  Psych: Normal mood and affect. Behavior is normal.      Assessment:  Left Facial pain and numbness  Follow-up    Plan:  Patient is currently on Cymbalta 60 mg daily. She reports some improvement in constant, dull, achy pain without any difference in intermittent, sharp, shooting pain and numbness on the left side of her face. She will complete the labs today. Will follow-up with [facility] team and will contact the patient with recommendations.          I spent 15 minutes on patient care activities related to this encounter on the date of service, including time spent reviewing the chart, obtaining history and examination and in counseling the patient, and in documentation in the electronic medical record.        Again, thank you for allowing me to participate in the care of your patient.      Sincerely,    KEO Knight CNP

## 2023-12-13 LAB
ANA SER QL IF: NEGATIVE
RHEUMATOID FACT SERPL-ACNC: <10 IU/ML

## 2023-12-20 ENCOUNTER — MYC MEDICAL ADVICE (OUTPATIENT)
Dept: NEUROSURGERY | Facility: CLINIC | Age: 38
End: 2023-12-20
Payer: COMMERCIAL

## 2023-12-20 ENCOUNTER — MYC REFILL (OUTPATIENT)
Dept: OTOLARYNGOLOGY | Facility: CLINIC | Age: 38
End: 2023-12-20
Payer: COMMERCIAL

## 2023-12-20 DIAGNOSIS — R51.9 FACIAL PAIN: ICD-10-CM

## 2023-12-20 RX ORDER — DULOXETIN HYDROCHLORIDE 60 MG/1
60 CAPSULE, DELAYED RELEASE ORAL DAILY
Qty: 30 CAPSULE | Refills: 2 | Status: SHIPPED | OUTPATIENT
Start: 2023-12-20 | End: 2024-03-14

## 2024-01-16 ENCOUNTER — MYC MEDICAL ADVICE (OUTPATIENT)
Dept: NEUROSURGERY | Facility: CLINIC | Age: 39
End: 2024-01-16
Payer: COMMERCIAL

## 2024-01-16 DIAGNOSIS — R51.9 FACIAL PAIN: Primary | ICD-10-CM

## 2024-01-17 DIAGNOSIS — R20.0 LEFT FACIAL NUMBNESS: Primary | ICD-10-CM

## 2024-01-17 RX ORDER — PREGABALIN 25 MG/1
CAPSULE ORAL
Qty: 408 CAPSULE | Refills: 0 | Status: SHIPPED | OUTPATIENT
Start: 2024-01-17 | End: 2024-03-29

## 2024-02-22 ENCOUNTER — MYC MEDICAL ADVICE (OUTPATIENT)
Dept: NEUROSURGERY | Facility: CLINIC | Age: 39
End: 2024-02-22
Payer: COMMERCIAL

## 2024-02-26 ENCOUNTER — TELEPHONE (OUTPATIENT)
Dept: NEUROSURGERY | Facility: CLINIC | Age: 39
End: 2024-02-26
Payer: COMMERCIAL

## 2024-02-26 NOTE — TELEPHONE ENCOUNTER
Prior Authorization Retail Medication Request    Medication/Dose: pregabalin (LYRICA) 25 MG capsule  Diagnosis and ICD code (if different than what is on RX):    New/renewal/insurance change PA/secondary ins. PA:  Previously Tried and Failed:  Cymbalta 60 mg daily. She reports some improvement in constant, dull, achy pain without any difference in intermittent, sharp, shooting pain and numbness on the left side of her face   Rationale:  failed cymbalta    Insurance   Primary:   Insurance ID:

## 2024-02-26 NOTE — TELEPHONE ENCOUNTER
Outbound call to patient to help with Lyrica medication, PA and MRI that is needed.   PA is in process, sorry for the wait, a glitch but now it is going strong and should hear sometime this week regarding Lyrica authorization from [employer].  MRI :   I am going back to the Provider to get clarification of the Order Name.  MRI appears to have several order names that are not current orders.  Patient now has my name and number if needed.  I will be back in touch with the MRI information.  Voices understanding, I explained I appreciate her patience.

## 2024-02-26 NOTE — TELEPHONE ENCOUNTER
PA Initiation    Medication: PREGABALIN 25 MG PO CAPS  Insurance Company: [employer] [place] - Phone [phone] Fax [phone]  Pharmacy Filling the Rx: [id number] - [place], [place] - [address] AT [facility] [address]  Filling Pharmacy Phone: [phone]  Filling Pharmacy Fax:    Start Date: 2/26/2024

## 2024-02-27 NOTE — TELEPHONE ENCOUNTER
PRIOR AUTHORIZATION DENIED    Medication: PREGABALIN 25 MG PO CAPS  Insurance Company: [employer] [place] - Phone [phone] Fax [phone]  Denial Date: 2/27/2024  Denial Reason(s): Needs to use higher dose capsule when titrating up- Max 3 caps per day allowed      Appeal Information: N/A  Patient Notified: No

## 2024-02-27 NOTE — TELEPHONE ENCOUNTER
Attn: Charline Collins RN  *RX Sharon Stratton, ANIYAH New scripts needed     Mayra Storey, SHANIAN  Neurosurgery

## 2024-02-28 DIAGNOSIS — R20.0 LEFT FACIAL NUMBNESS: Primary | ICD-10-CM

## 2024-02-28 RX ORDER — PREGABALIN 25 MG/1
25 CAPSULE ORAL 2 TIMES DAILY
Qty: 30 CAPSULE | Refills: 0 | Status: SHIPPED | OUTPATIENT
Start: 2024-02-28 | End: 2024-03-14

## 2024-02-29 ENCOUNTER — TELEPHONE (OUTPATIENT)
Dept: NEUROSURGERY | Facility: CLINIC | Age: 39
End: 2024-02-29
Payer: COMMERCIAL

## 2024-02-29 DIAGNOSIS — R20.0 LEFT FACIAL NUMBNESS: Primary | ICD-10-CM

## 2024-02-29 DIAGNOSIS — R51.9 FACIAL PAIN: ICD-10-CM

## 2024-02-29 DIAGNOSIS — G50.0 TRIGEMINAL NEURALGIA: ICD-10-CM

## 2024-02-29 NOTE — TELEPHONE ENCOUNTER
Spoke with Christelle, Christelle has the Pregabalin and will start medication to help with the chin numbness/burning discomfort.  The Cymbalta is for the long term pain and issues.   The MRI's ordered is from Radiology as the original facial orbits MRI does not exist anymore.  Let me bring this to the Facial Pain conference on Monday to verify all MRI's are needed to see what they are looking for.      Patient voices understanding, will hold off on scheduling MRI's.  Patient will start medication.    Voices understanding, Christelle has my name and number if needed.

## 2024-02-29 NOTE — PROGRESS NOTES
During the recent [facility], Face MRI was recommended to r/o NUMB chin syndrome, and to evaluate for a neoplasm or lesion that could explain left CN V3 symptomatology. This was ordered on 1/17/2024. Recently I was notified that the order I placed previously is not an active order.     Per discussion between clinic manager and MRI staff, patient will need 5 different orders including MRI orbit with, without, MR soft tissue W/WO, MR Sinonasal cavity/ parotid glad With and then without. Dr. Erickson suggested to complete these imaging at [facility], no need for 7T. [facility] I was told these MRIs can not be done at [facility].     Above orders were placed yesterday. Will confirm with facial pain team next week before we schedule these imaging.

## 2024-03-14 ENCOUNTER — MYC MEDICAL ADVICE (OUTPATIENT)
Dept: NEUROSURGERY | Facility: CLINIC | Age: 39
End: 2024-03-14
Payer: COMMERCIAL

## 2024-03-14 ENCOUNTER — MYC REFILL (OUTPATIENT)
Dept: NEUROSURGERY | Facility: CLINIC | Age: 39
End: 2024-03-14
Payer: COMMERCIAL

## 2024-03-14 DIAGNOSIS — R51.9 FACIAL PAIN: ICD-10-CM

## 2024-03-14 DIAGNOSIS — R20.0 LEFT FACIAL NUMBNESS: ICD-10-CM

## 2024-03-14 DIAGNOSIS — G50.0 TRIGEMINAL NEURALGIA: Primary | ICD-10-CM

## 2024-03-14 RX ORDER — DULOXETIN HYDROCHLORIDE 60 MG/1
60 CAPSULE, DELAYED RELEASE ORAL DAILY
Qty: 30 CAPSULE | Refills: 2 | Status: SHIPPED | OUTPATIENT
Start: 2024-03-14 | End: 2024-06-17

## 2024-03-14 RX ORDER — PREGABALIN 25 MG/1
50 CAPSULE ORAL 2 TIMES DAILY
Qty: 120 CAPSULE | Refills: 2 | Status: SHIPPED | OUTPATIENT
Start: 2024-03-14

## 2024-03-14 NOTE — TELEPHONE ENCOUNTER
"Spoke with patient.  Updates:   Completed 14 days of Pregabalin 25mg twice daily.   Facial pain has improved, just a few strikes, not as strong.  Patient denies any adverse effects, tolerating well.   Pregabalin Increased to 50mg 2 x daily per Sharon Stratton NP instructions.  Patient will take for another month and will check on facial symptoms and how she is tolerating increased dose.    2. MRI for facial area:  Per Dr Juarez it would be Face:   MR Sinonasal/Oral Cavity/Parotid wwo Contrast [[id number]] aka face  Order is already in Epic, other orders deleted.  \"Patient presenting with left sided facial/ chin numbness. Evaluate for numb chin syndrome, special attention to the length of the trigeminal nerve and its branches extending through the face and mandible \"    3.  Cymbalta 60mg daily refilled.    Patient will schedule the MRI to find a time that will work for her.  Patient has my name and number if needed.    "

## 2024-03-18 RX ORDER — PREGABALIN 50 MG/1
50 CAPSULE ORAL 2 TIMES DAILY
Qty: 60 CAPSULE | Refills: 5 | Status: SHIPPED | OUTPATIENT
Start: 2024-03-18 | End: 2024-05-19

## 2024-03-18 NOTE — TELEPHONE ENCOUNTER
Insurance denied Pregablin 25mg taking 4x per day.  Pharmacy recommended trying 50mg twice daily.  Medication called into pharmacy for Pregabalin 50mg twice daily Qty 60 with 5 refills.    Pharmacy voices understanding, no change in total dosing, in 2x per day dosing.    Voices understanding.

## 2024-03-24 ENCOUNTER — ANCILLARY PROCEDURE (OUTPATIENT)
Dept: MRI IMAGING | Facility: CLINIC | Age: 39
End: 2024-03-24
Attending: NURSE PRACTITIONER
Payer: COMMERCIAL

## 2024-03-24 DIAGNOSIS — R20.0 LEFT FACIAL NUMBNESS: ICD-10-CM

## 2024-03-24 PROCEDURE — A9585 GADOBUTROL INJECTION: HCPCS | Performed by: STUDENT IN AN ORGANIZED HEALTH CARE EDUCATION/TRAINING PROGRAM

## 2024-03-24 PROCEDURE — 70543 MRI ORBT/FAC/NCK W/O &W/DYE: CPT | Mod: GC | Performed by: STUDENT IN AN ORGANIZED HEALTH CARE EDUCATION/TRAINING PROGRAM

## 2024-03-24 RX ORDER — GADOBUTROL 604.72 MG/ML
7.5 INJECTION INTRAVENOUS ONCE
Status: COMPLETED | OUTPATIENT
Start: 2024-03-24 | End: 2024-03-24

## 2024-03-24 RX ADMIN — GADOBUTROL 7.5 ML: 604.72 INJECTION INTRAVENOUS at 13:10

## 2024-03-25 ENCOUNTER — MYC MEDICAL ADVICE (OUTPATIENT)
Dept: NEUROSURGERY | Facility: CLINIC | Age: 39
End: 2024-03-25
Payer: COMMERCIAL

## 2024-03-25 NOTE — TELEPHONE ENCOUNTER
Attn: Charline Collins, RNCC   *On going MyChart conversation     Mayra Storey, SHANIAN  Neurosurgery

## 2024-03-28 ENCOUNTER — TELEPHONE (OUTPATIENT)
Dept: NEUROSURGERY | Facility: CLINIC | Age: 39
End: 2024-03-28
Payer: COMMERCIAL

## 2024-03-28 DIAGNOSIS — Z91.041 ALLERGY HISTORY, RADIOGRAPHIC DYE: Primary | ICD-10-CM

## 2024-03-28 RX ORDER — METHYLPREDNISOLONE 4 MG
TABLET, DOSE PACK ORAL
Qty: 21 TABLET | Refills: 0 | Status: SHIPPED | OUTPATIENT
Start: 2024-03-28

## 2024-03-28 NOTE — TELEPHONE ENCOUNTER
Charline: Charline Collins, RNCC for Sharon Stratton CNP  *On going MyChart conversation - photos attached.     Mayra Storey LPN  Neurosurgery

## 2024-03-28 NOTE — TELEPHONE ENCOUNTER
Spoke with Christelle,  Hives remain from Chest down with redness and burning sensation, not improved on Benadryl for past 2 days.  No fever denies any facial swelling, throat swelling.  Per Jaime, Medrol Dose pac.  Patient voices understanding, ordered at local pharmacy, patient to  today and callback with any further issues.

## 2024-04-15 ENCOUNTER — MYC MEDICAL ADVICE (OUTPATIENT)
Dept: NEUROSURGERY | Facility: CLINIC | Age: 39
End: 2024-04-15
Payer: COMMERCIAL

## 2024-04-19 ENCOUNTER — TELEPHONE (OUTPATIENT)
Dept: PHARMACY | Facility: CLINIC | Age: 39
End: 2024-04-19
Payer: COMMERCIAL

## 2024-04-19 NOTE — TELEPHONE ENCOUNTER
I was asked to call Christelle by Sharon Stratton to discuss her medications. Christelle had a rash that might be related to lyrica or MRI dye that she had. Christelle was comfortable doing a retry of Lyrica and it had helped some of her pain. We discussed that we are most worried about thompson olamide or TEN and if she notices anything she should stop the lyrica. She still has 50 mg capsules. She is going to take 50 mg at bedtime for a least 3 days and then increase to 50 mg twice day.    If she does have a rash we can consider topiramate or lacosamide next.    Annette Barone, PharmD, BCPS  [phone]

## 2024-04-26 ENCOUNTER — TELEPHONE (OUTPATIENT)
Dept: NEUROSURGERY | Facility: CLINIC | Age: 39
End: 2024-04-26
Payer: COMMERCIAL

## 2024-05-14 ENCOUNTER — OFFICE VISIT (OUTPATIENT)
Dept: NEUROSURGERY | Facility: CLINIC | Age: 39
End: 2024-05-14
Payer: COMMERCIAL

## 2024-05-14 VITALS
DIASTOLIC BLOOD PRESSURE: 75 MMHG | WEIGHT: 188.4 LBS | OXYGEN SATURATION: 96 % | BODY MASS INDEX: 26.97 KG/M2 | HEART RATE: 75 BPM | SYSTOLIC BLOOD PRESSURE: 107 MMHG | HEIGHT: 70 IN

## 2024-05-14 DIAGNOSIS — G50.0 TRIGEMINAL NEURALGIA: Primary | ICD-10-CM

## 2024-05-14 PROCEDURE — 99213 OFFICE O/P EST LOW 20 MIN: CPT | Mod: 4AS | Performed by: NEUROLOGICAL SURGERY

## 2024-05-14 RX ORDER — OXCARBAZEPINE 150 MG/1
150 TABLET, FILM COATED ORAL 2 TIMES DAILY
Qty: 60 TABLET | Refills: 2 | Status: SHIPPED | OUTPATIENT
Start: 2024-05-14 | End: 2024-08-09

## 2024-05-14 NOTE — LETTER
"5/14/2024       RE: Christelle Bob  [address] [place] [address]       Dear Colleague,    Thank you for referring your patient, Christelle Bob, to the [facility] NEUROSURGERY CLINIC [place] at [facility]. Please see a copy of my visit note below.    Christelle Bob is a 39-year-old female with a notable past medical history of facial pain who has been seen by her facial pain clinic.  The patient reports that she has had facial pain affecting the left side of her face for quite a long time.  The patient has been following with Sharon for evaluation and assessment of her pain.  Patient reports that her facial pain is almost exclusively in the V2 V3 distribution, affecting her left jaw and above her lip.  She also notes significant numbness in this distribution primarily in her lower lip.  She also notes a strong burning sensation on her left tongue, occasionally to eating really hot food.    Notably, the patient was previously started on Tegretol, however she had an MRI couple days later after she had initiated this medication and had significant drug reaction.  Because it was unclear whether she had a reaction to the Tegretol versus contrast, she was stopped on Tegretol and continued on Cymbalta and Lyrica for the management of her pain.  These medications have helped decrease the constant aching pain, but has ongoing sharp shooting pain in the V2 V3 distribution.    /75 (BP Location: Right arm, Patient Position: Sitting, Cuff Size: Adult Regular)   Pulse 75   Ht 1.778 m (5' 10\")   Wt 85.5 kg (188 lb 6.4 oz)   SpO2 96%   BMI 27.03 kg/m    On physical exam, the patient is awake, alert and oriented x 3.  Her face is symmetric, pupils are reactive equally with extraocular movements intact.  The patient has a midline tongue and symmetric palatal elevation.  All extremities are moving symmetrically without any indication of deficit.    On her MRI " performed on 3/24/2024 was reviewed.  Her MRI demonstrates that there is a branch/loop of the superior cerebellar artery in contact with the trigeminal nerve bilaterally.  On the left side, the site of her pain, there appears to be a vascular conflict that is identifiable along the course of the trigeminal nerve, there is no clear indication of any mass.  There is no clear evidence of a mass or lesion in Meckel's cave or foramen ovale/rotundum at the exit of V2-V3.    At this point, while her pain is significant, and is following the V2 V3 distribution, she additionally also has numbness.  A surgical approach such as a microvascular decompression may alleviate the pain, but will not affect or alter the numbness likely.  At this point, Tegretol or Trileptal can be considered as the etiology of her rash/hives were thought to be a reaction to the gadolinium contrast used and MRIs.  We will plan to start Trileptal and then have the patient return in a few months to discuss surgical options.    Hilton Alegre MD        Again, thank you for allowing me to participate in the care of your patient.      Sincerely,    Jered Trivedi MD

## 2024-05-14 NOTE — PATIENT INSTRUCTIONS
Start Trileptal 150mg daily after 7 days increase to one tablet twice daily.    Continue Lyrica 50mg and Cymbalta 60 daily.    Follow up with Dr Trivedi in 2 months.    Call Charline MENJIVAR Neurosurgery Care Coordinator for any questions/reactions/concerns

## 2024-05-14 NOTE — PROGRESS NOTES
"Christelle Bob is a 39-year-old female with a notable past medical history of facial pain who has been seen by her facial pain clinic.  The patient reports that she has had facial pain affecting the left side of her face for quite a long time.  The patient has been following with Sharon for evaluation and assessment of her pain.  Patient reports that her facial pain is almost exclusively in the V2 V3 distribution, affecting her left jaw and above her lip.  She also notes significant numbness in this distribution primarily in her lower lip.  She also notes a strong burning sensation on her left tongue, occasionally to eating really hot food.    Notably, the patient was previously started on Tegretol, however she had an MRI couple days later after she had initiated this medication and had significant drug reaction.  Because it was unclear whether she had a reaction to the Tegretol versus contrast, she was stopped on Tegretol and continued on Cymbalta and Lyrica for the management of her pain.  These medications have helped decrease the constant aching pain, but has ongoing sharp shooting pain in the V2 V3 distribution.    /75 (BP Location: Right arm, Patient Position: Sitting, Cuff Size: Adult Regular)   Pulse 75   Ht 1.778 m (5' 10\")   Wt 85.5 kg (188 lb 6.4 oz)   SpO2 96%   BMI 27.03 kg/m    On physical exam, the patient is awake, alert and oriented x 3.  Her face is symmetric, pupils are reactive equally with extraocular movements intact.  The patient has a midline tongue and symmetric palatal elevation.  All extremities are moving symmetrically without any indication of deficit.    On her MRI performed on 3/24/2024 was reviewed.  Her MRI demonstrates that there is a branch/loop of the superior cerebellar artery in contact with the trigeminal nerve bilaterally.  On the left side, the site of her pain, there appears to be a vascular conflict that is identifiable along the course of the trigeminal nerve, " [facility]  Progress Note    Patient: Fabiana Bean Age: 66 y.o. : 1941 MR#: [id number] SSN: [id number]  Date/Time: 2017 8:51 AM    Subjective/24-hour events:     Had some nausea yesterday evening, but this has improved. Still with some SOB, denies chest pain. Assessment:   Hypoxia - concern for possible CHF  Aortic stenosis  Suspected UTI  Fever  HTN  DM 2  CAD with hx of NSTEMI and stent placement  Anemia - suspect chronic disease, Fe deficiency  CKD 3    Plan:  Echo results noted - will ask cardiology to see. Assistance/recs appreciated in advance. D/C levaquin, monitor off. SSI as ordered. Blood sugars in acceptable control. PT/OT evals pending. Mobilize. Case discussed with:  [x]Patient  []Family  []Nursing  []Case Management  DVT Prophylaxis:  []Lovenox  []Hep SQ  []SCDs  []Coumadin   []On Brilinta    Objective:   VS:   Visit Vitals    /67 (BP 1 Location: Left arm, BP Patient Position: At rest)    Pulse 85    Temp 97 °F (36.1 °C)    Resp 12    Ht 5' (1.524 m)    Wt 61.2 kg (135 lb)    SpO2 95%    BMI 26.37 kg/m2      Tmax/24hrs: Temp (24hrs), Av.7 °F (37.1 °C), Min:97 °F (36.1 °C), Max:100.2 °F (37.9 °C)      Intake/Output Summary (Last 24 hours) at 17 0851  Last data filed at 17 1342   Gross per 24 hour   Intake              720 ml   Output              550 ml   Net              170 ml       General:  In NAD. Nontoxic-appearing. Cardiovascular:  RRR. Pulmonary:  No wheezes, effort nonlabored. GI:  Abdomen soft, NTTP. Extremities:  Warm, no ischemia. Additional:  Awake and alert. Motor grossly nonfocal.    Labs/Ancillary:  2D echo:  SUMMARY:  Left ventricle: Systolic function was normal by visual assessment. Ejection fraction was estimated to be 55 %. There was possible mild  hypokinesis of the mid inferoseptal, basal inferior, mid inferolateral,  and apical lateral wall(s).  Wall thickness was mildly increased. Features  were consistent with a pseudonormal left ventricular filling pattern, with  concomitant abnormal relaxation and increased filling pressure (grade 2  diastolic dysfunction). Right ventricle: Systolic pressure was mildly increased. Estimated peak  pressure was 43 mmHg. Aortic valve: The valve was probably trileaflet. Leaflets exhibited mildly  increased thickness, calcification, reduced mobility, and sclerosis. There  was mild stenosis. Valve peak gradient was 35 mmHg. Valve mean gradient  was 16 mmHg. Estimated aortic valve area was 1.32 cm-sq. COMPARISONS:  Comparison was made with the previous study of 01-Jul-2016. Aortic  stenosis has worsened from mean gradient of 8 mmHg to 16 mmHg. Pulmonary  artery pressure has decreased.         Recent Results (from the past 24 hour(s))   GLUCOSE, POC    Collection Time: 06/22/17 11:42 AM   Result Value Ref Range    Glucose (POC) 252 (H) 70 - 110 mg/dL   GLUCOSE, POC    Collection Time: 06/22/17  4:31 PM   Result Value Ref Range    Glucose (POC) 155 (H) 70 - 110 mg/dL   GLUCOSE, POC    Collection Time: 06/22/17  9:45 PM   Result Value Ref Range    Glucose (POC) 145 (H) 70 - 110 mg/dL   GLUCOSE, POC    Collection Time: 06/23/17  7:47 AM   Result Value Ref Range    Glucose (POC) 137 (H) 70 - 110 mg/dL       Signed By: Hammad Sullivan MD     June 23, 2017 8:51 AM there is no clear indication of any mass.  There is no clear evidence of a mass or lesion in Meckel's cave or foramen ovale/rotundum at the exit of V2-V3.    At this point, while her pain is significant, and is following the V2 V3 distribution, she additionally also has numbness.  A surgical approach such as a microvascular decompression may alleviate the pain, but will not affect or alter the numbness likely.  At this point, Tegretol or Trileptal can be considered as the etiology of her rash/hives were thought to be a reaction to the gadolinium contrast used and MRIs.  We will plan to start Trileptal and then have the patient return in a few months to discuss surgical options.    Hilton Alegre MD

## 2024-05-19 ENCOUNTER — MYC REFILL (OUTPATIENT)
Dept: NEUROSURGERY | Facility: CLINIC | Age: 39
End: 2024-05-19
Payer: COMMERCIAL

## 2024-05-19 DIAGNOSIS — R51.9 FACIAL PAIN: ICD-10-CM

## 2024-05-19 DIAGNOSIS — R20.0 LEFT FACIAL NUMBNESS: ICD-10-CM

## 2024-05-19 DIAGNOSIS — G50.0 TRIGEMINAL NEURALGIA: ICD-10-CM

## 2024-05-20 RX ORDER — PREGABALIN 50 MG/1
50 CAPSULE ORAL 2 TIMES DAILY
Qty: 60 CAPSULE | Refills: 5 | Status: SHIPPED | OUTPATIENT
Start: 2024-05-20

## 2024-05-29 ENCOUNTER — LAB REQUISITION (OUTPATIENT)
Dept: LAB | Facility: CLINIC | Age: 39
End: 2024-05-29

## 2024-05-29 DIAGNOSIS — Z13.220 ENCOUNTER FOR SCREENING FOR LIPOID DISORDERS: ICD-10-CM

## 2024-05-29 DIAGNOSIS — Z13.29 ENCOUNTER FOR SCREENING FOR OTHER SUSPECTED ENDOCRINE DISORDER: ICD-10-CM

## 2024-05-29 DIAGNOSIS — Z13.1 ENCOUNTER FOR SCREENING FOR DIABETES MELLITUS: ICD-10-CM

## 2024-05-29 LAB
CHOLEST SERPL-MCNC: 195 MG/DL
FASTING STATUS PATIENT QL REPORTED: YES
HBA1C MFR BLD: 5.9 %
HDLC SERPL-MCNC: 44 MG/DL
LDLC SERPL CALC-MCNC: 115 MG/DL
NONHDLC SERPL-MCNC: 151 MG/DL
TRIGL SERPL-MCNC: 178 MG/DL
TSH SERPL DL<=0.005 MIU/L-ACNC: 1.55 UIU/ML (ref 0.3–4.2)

## 2024-05-29 PROCEDURE — 83036 HEMOGLOBIN GLYCOSYLATED A1C: CPT | Performed by: OBSTETRICS & GYNECOLOGY

## 2024-05-29 PROCEDURE — 84443 ASSAY THYROID STIM HORMONE: CPT | Performed by: OBSTETRICS & GYNECOLOGY

## 2024-05-29 PROCEDURE — 80061 LIPID PANEL: CPT | Performed by: OBSTETRICS & GYNECOLOGY

## 2024-06-03 ENCOUNTER — TELEPHONE (OUTPATIENT)
Dept: NEUROSURGERY | Facility: CLINIC | Age: 39
End: 2024-06-03
Payer: COMMERCIAL

## 2024-06-17 ENCOUNTER — MYC REFILL (OUTPATIENT)
Dept: NEUROSURGERY | Facility: CLINIC | Age: 39
End: 2024-06-17
Payer: COMMERCIAL

## 2024-06-17 DIAGNOSIS — R51.9 FACIAL PAIN: ICD-10-CM

## 2024-06-17 RX ORDER — DULOXETIN HYDROCHLORIDE 60 MG/1
60 CAPSULE, DELAYED RELEASE ORAL DAILY
Qty: 90 CAPSULE | Refills: 1 | Status: SHIPPED | OUTPATIENT
Start: 2024-06-17 | End: 2024-09-18 | Stop reason: DRUGHIGH

## 2024-07-24 ENCOUNTER — TELEPHONE (OUTPATIENT)
Dept: NEUROSURGERY | Facility: CLINIC | Age: 39
End: 2024-07-24
Payer: COMMERCIAL

## 2024-07-24 NOTE — TELEPHONE ENCOUNTER
Left pt a detailed message regarding her 8/20 appt. The time changed from 11:40 to 1:00 per Charline Pete.

## 2024-08-09 ENCOUNTER — MYC REFILL (OUTPATIENT)
Dept: NEUROSURGERY | Facility: CLINIC | Age: 39
End: 2024-08-09
Payer: COMMERCIAL

## 2024-08-09 DIAGNOSIS — G50.0 TRIGEMINAL NEURALGIA: ICD-10-CM

## 2024-08-12 RX ORDER — OXCARBAZEPINE 150 MG/1
150 TABLET, FILM COATED ORAL 2 TIMES DAILY
Qty: 60 TABLET | Refills: 2 | Status: SHIPPED | OUTPATIENT
Start: 2024-08-12

## 2024-08-20 ENCOUNTER — OFFICE VISIT (OUTPATIENT)
Dept: NEUROSURGERY | Facility: CLINIC | Age: 39
End: 2024-08-20
Attending: NEUROLOGICAL SURGERY
Payer: COMMERCIAL

## 2024-08-20 VITALS
HEART RATE: 87 BPM | RESPIRATION RATE: 16 BRPM | SYSTOLIC BLOOD PRESSURE: 128 MMHG | DIASTOLIC BLOOD PRESSURE: 82 MMHG | OXYGEN SATURATION: 98 %

## 2024-08-20 DIAGNOSIS — G50.0 TRIGEMINAL NEURALGIA: ICD-10-CM

## 2024-08-20 PROCEDURE — 99212 OFFICE O/P EST SF 10 MIN: CPT | Mod: GC | Performed by: NEUROLOGICAL SURGERY

## 2024-08-20 ASSESSMENT — PAIN SCALES - GENERAL: PAINLEVEL: NO PAIN (0)

## 2024-08-20 NOTE — PATIENT INSTRUCTIONS
Taper Cymbalta and Lyrica (continue Oxcarbazepine)  Pharmacy will be consulted and you will be called with a tapering schedule.    Follow up with Dr Trivedi as needed.    Call Charline MENJIVAR  Neurosurgery Care Coordinator with questions/concerns     Thank you for using [facility]

## 2024-08-20 NOTE — PROGRESS NOTES
8/20/2024  Neuroendovascular Clinic Visit    Chief Complaint: Follow up for facial pain    History of present illness:  Christelle Bob is a 39 year old female with a notable past medical history of facial pain. She has had facial pain affecting the left side of her face for quite a long time, and had been following with Sharon for evaluation and assessment of her pain.  At that time, patient reported that her facial pain was almost exclusively in the V2 V3 distribution, affecting her left jaw and above her lip.  She also noted significant numbness in this distribution primarily in her lower lip, with a strong burning sensation on her left tongue, occasionally to eating really hot food.     She had previously trialed Neurontin and Tegretol, with reported allergic reactions to both agents (Hives). Subsequently placed on Lyrica and Cymbalta, with minimal relief of her symptoms reported.    3 months ago, a trial of Trileptal (oxcarbazepine) was initiated. She now presents to her follow up visit with reports of near total pain relief. States that prior to commencing trileptal, her facial pain was about 6-7/10 in severity, but is currently 1/10, and she is pain free on most days. However endorses residual numbness in the V3 distribution.    She is happy with the current treatment, and requests to have the other agents weaned off.        Physical exam:   /82   Pulse 87   Resp 16   SpO2 98%   General: Awake and alert and in no acute distress.  Pulm: Breathing comfortably on room air  Neurologic:  - Face symmetric. Diminished sensation in the  left V3 distribution  - Pupils reactive, extraocular movements intact  - Full strength in upper and lower extremities  - Intact sensation to all extremities  - No clonus, patellar and bicep reflexes 2+  - Intact coordination    Imaging:  No new imaging obtained.    Assessment:  Christelle Bob is a 39 year old female with a notable past medical history of facial pain. Recently  started on oxcarbazepine with remarkable improvement in her facial pain. This is suggestive of a diagnosis of Trigeminal neuralgia. She still reports residual numbness, however patient understands that this is unlikely to completely resolve.    Plan:  - Wean off Cymbalta first, then Lyrica afterwards. Will discuss with the pharmacist  - No indication for further in-person follow up visits. Questions and concerns can be addressed via virtual visits.    Patient seen and discussed with Endovascular surgical Neuroradiology staff, Dr. Farooq MD.    Approximately 30 minutes were spent in chart and image review, evaluation of the patient and assessment of next steps.    Yovanny Patrick MD, PGY-1  Department of Neurosurgery  Pager: [phone]      REVIEWED ASSOCIATED CLINICAL DATA AND HISTORY FOUND IN THE MEDICAL RECORD:  Past Medical History:   No past medical history on file.    Surgical History:   Past Surgical History:   Procedure Laterality Date    EYE SURGERY  8/13/2010    PTK       Social history:   Social History     Tobacco Use    Smoking status: Never    Smokeless tobacco: Never   Vaping Use    Vaping status: Never Used   Substance Use Topics    Alcohol use: No    Drug use: No       Family history:   Family History   Problem Relation Age of Onset    Hypertension Father     Hyperlipidemia Father     Diabetes Maternal Grandfather     Breast Cancer No family hx of     Colon Cancer No family hx of     Ovarian Cancer No family hx of        Medications:  Current Outpatient Medications   Medication Sig Dispense Refill    DULoxetine (CYMBALTA) 60 MG capsule Take 1 capsule (60 mg) by mouth daily 90 capsule 1    OXcarbazepine (TRILEPTAL) 150 MG tablet Take 1 tablet (150 mg) by mouth 2 times daily 60 tablet 2    pregabalin (LYRICA) 50 MG capsule Take 1 capsule (50 mg) by mouth 2 times daily 60 capsule 5    gabapentin (NEURONTIN) 300 MG capsule Day 1-3: 1 tablet at night Day 4-6: 1 tablet twice daily Day 7 onwards 1  tablet three times a day and continue this dose. (Patient not taking: Reported on 11/14/2023) 90 capsule 1    methylPREDNISolone (MEDROL DOSEPAK) 4 MG tablet therapy pack Follow Package Directions 21 tablet 0    pregabalin (LYRICA) 25 MG capsule Take 2 capsules (50 mg) by mouth 2 times daily 120 capsule 2    pregabalin (LYRICA) 25 MG capsule Take 1 capsule (25 mg) by mouth 2 times daily for 14 days, THEN 2 capsules (50 mg) 2 times daily for 14 days, THEN 3 capsules (75 mg) 2 times daily for 14 days, THEN 4 capsules (100 mg) 2 times daily for 30 days. 408 capsule 0     No current facility-administered medications for this visit.       Allergies:     Allergies   Allergen Reactions    Carbamazepine Hives     11/16/23 Hives from neck to toes, itchy red hot.     Gadolinium Hives

## 2024-08-20 NOTE — LETTER
8/20/2024       RE: Christelle Bob  [address] [place] [address]     Dear Colleague,    Thank you for referring your patient, Christelle Bob, to the [facility] NEUROSURGERY [facility] [place] [facility]. Please see a copy of my visit note below.    8/20/2024  Neuroendovascular Clinic Visit    Chief Complaint: Follow up for facial pain    History of present illness:  Christelle Bob is a 39 year old female with a notable past medical history of facial pain. She has had facial pain affecting the left side of her face for quite a long time, and had been following with Sharon for evaluation and assessment of her pain.  At that time, patient reported that her facial pain was almost exclusively in the V2 V3 distribution, affecting her left jaw and above her lip.  She also noted significant numbness in this distribution primarily in her lower lip, with a strong burning sensation on her left tongue, occasionally to eating really hot food.     She had previously trialed Neurontin and Tegretol, with reported allergic reactions to both agents (Hives). Subsequently placed on Lyrica and Cymbalta, with minimal relief of her symptoms reported.    3 months ago, a trial of Trileptal (oxcarbazepine) was initiated. She now presents to her follow up visit with reports of near total pain relief. States that prior to commencing trileptal, her facial pain was about 6-7/10 in severity, but is currently 1/10, and she is pain free on most days. However endorses residual numbness in the V3 distribution.    She is happy with the current treatment, and requests to have the other agents weaned off.        Physical exam:   /82   Pulse 87   Resp 16   SpO2 98%   General: Awake and alert and in no acute distress.  Pulm: Breathing comfortably on room air  Neurologic:  - Face symmetric. Diminished sensation in the  left V3 distribution  - Pupils reactive, extraocular movements  intact  - Full strength in upper and lower extremities  - Intact sensation to all extremities  - No clonus, patellar and bicep reflexes 2+  - Intact coordination    Imaging:  No new imaging obtained.    Assessment:  Christelle Bob is a 39 year old female with a notable past medical history of facial pain. Recently started on oxcarbazepine with remarkable improvement in her facial pain. This is suggestive of a diagnosis of Trigeminal neuralgia. She still reports residual numbness, however patient understands that this is unlikely to completely resolve.    Plan:  - Wean off Cymbalta first, then Lyrica afterwards. Will discuss with the pharmacist  - No indication for further in-person follow up visits. Questions and concerns can be addressed via virtual visits.    Patient seen and discussed with Endovascular surgical Neuroradiology staff, Dr. Farooq MD.    Approximately 30 minutes were spent in chart and image review, evaluation of the patient and assessment of next steps.    Yovanny Patrick MD, PGY-1  Department of Neurosurgery  Pager: [phone]      REVIEWED ASSOCIATED CLINICAL DATA AND HISTORY FOUND IN THE MEDICAL RECORD:  Past Medical History:   No past medical history on file.    Surgical History:   Past Surgical History:   Procedure Laterality Date     EYE SURGERY  8/13/2010    PTK       Social history:   Social History     Tobacco Use     Smoking status: Never     Smokeless tobacco: Never   Vaping Use     Vaping status: Never Used   Substance Use Topics     Alcohol use: No     Drug use: No       Family history:   Family History   Problem Relation Age of Onset     Hypertension Father      Hyperlipidemia Father      Diabetes Maternal Grandfather      Breast Cancer No family hx of      Colon Cancer No family hx of      Ovarian Cancer No family hx of        Medications:  Current Outpatient Medications   Medication Sig Dispense Refill     DULoxetine (CYMBALTA) 60 MG capsule Take 1 capsule (60 mg) by mouth  daily 90 capsule 1     OXcarbazepine (TRILEPTAL) 150 MG tablet Take 1 tablet (150 mg) by mouth 2 times daily 60 tablet 2     pregabalin (LYRICA) 50 MG capsule Take 1 capsule (50 mg) by mouth 2 times daily 60 capsule 5     gabapentin (NEURONTIN) 300 MG capsule Day 1-3: 1 tablet at night Day 4-6: 1 tablet twice daily Day 7 onwards 1 tablet three times a day and continue this dose. (Patient not taking: Reported on 11/14/2023) 90 capsule 1     methylPREDNISolone (MEDROL DOSEPAK) 4 MG tablet therapy pack Follow Package Directions 21 tablet 0     pregabalin (LYRICA) 25 MG capsule Take 2 capsules (50 mg) by mouth 2 times daily 120 capsule 2     pregabalin (LYRICA) 25 MG capsule Take 1 capsule (25 mg) by mouth 2 times daily for 14 days, THEN 2 capsules (50 mg) 2 times daily for 14 days, THEN 3 capsules (75 mg) 2 times daily for 14 days, THEN 4 capsules (100 mg) 2 times daily for 30 days. 408 capsule 0     No current facility-administered medications for this visit.       Allergies:     Allergies   Allergen Reactions     Carbamazepine Hives     11/16/23 Hives from neck to toes, itchy red hot.      Gadolinium Hives   Again, thank you for allowing me to participate in the care of your patient.      Sincerely,    Jered Trivedi MD

## 2024-09-18 ENCOUNTER — TELEPHONE (OUTPATIENT)
Dept: PHARMACY | Facility: CLINIC | Age: 39
End: 2024-09-18
Payer: COMMERCIAL

## 2024-09-18 DIAGNOSIS — R51.9 FACIAL PAIN: Primary | ICD-10-CM

## 2024-09-18 RX ORDER — DULOXETIN HYDROCHLORIDE 20 MG/1
CAPSULE, DELAYED RELEASE ORAL
Qty: 42 CAPSULE | Refills: 0 | Status: SHIPPED | OUTPATIENT
Start: 2024-09-18

## 2024-09-18 NOTE — TELEPHONE ENCOUNTER
I was asked by Dr. Trivedi to design a plan to discontinue Christelle's duloxetine and pregabalin. In talking to Christelle, she would like to start by tapering the duloxetine. The plan is to decrease to 40 mg a day for 2 weeks, then 20 mg a day for 2 weeks and then stop. Once she's done with that, she can decrease her pregabalin to 50 mg in the evening for 2 weeks and then stop the pregabalin. A new rx for 20 mg capsules of duloxetine was sent to her pharmacy.    Annette Barone, PharmD, BCPS  [phone]

## 2024-11-06 SDOH — HEALTH STABILITY: PHYSICAL HEALTH: ON AVERAGE, HOW MANY MINUTES DO YOU ENGAGE IN EXERCISE AT THIS LEVEL?: 30 MIN

## 2024-11-06 SDOH — HEALTH STABILITY: PHYSICAL HEALTH: ON AVERAGE, HOW MANY DAYS PER WEEK DO YOU ENGAGE IN MODERATE TO STRENUOUS EXERCISE (LIKE A BRISK WALK)?: 3 DAYS

## 2024-11-06 ASSESSMENT — PATIENT HEALTH QUESTIONNAIRE - PHQ9
SUM OF ALL RESPONSES TO PHQ QUESTIONS 1-9: 5
SUM OF ALL RESPONSES TO PHQ QUESTIONS 1-9: 5
10. IF YOU CHECKED OFF ANY PROBLEMS, HOW DIFFICULT HAVE THESE PROBLEMS MADE IT FOR YOU TO DO YOUR WORK, TAKE CARE OF THINGS AT HOME, OR GET ALONG WITH OTHER PEOPLE: NOT DIFFICULT AT ALL

## 2024-11-06 ASSESSMENT — SOCIAL DETERMINANTS OF HEALTH (SDOH): HOW OFTEN DO YOU GET TOGETHER WITH FRIENDS OR RELATIVES?: ONCE A WEEK

## 2024-11-11 ENCOUNTER — OFFICE VISIT (OUTPATIENT)
Dept: FAMILY MEDICINE | Facility: CLINIC | Age: 39
End: 2024-11-11
Attending: FAMILY MEDICINE
Payer: COMMERCIAL

## 2024-11-11 VITALS
BODY MASS INDEX: 28.58 KG/M2 | SYSTOLIC BLOOD PRESSURE: 110 MMHG | OXYGEN SATURATION: 100 % | DIASTOLIC BLOOD PRESSURE: 74 MMHG | TEMPERATURE: 97.1 F | HEIGHT: 69 IN | HEART RATE: 68 BPM | RESPIRATION RATE: 15 BRPM | WEIGHT: 193 LBS

## 2024-11-11 DIAGNOSIS — Z00.00 ROUTINE GENERAL MEDICAL EXAMINATION AT A HEALTH CARE FACILITY: Primary | ICD-10-CM

## 2024-11-11 DIAGNOSIS — R73.09 ABNORMAL GLUCOSE: ICD-10-CM

## 2024-11-11 DIAGNOSIS — Z23 ENCOUNTER FOR VACCINATION: ICD-10-CM

## 2024-11-11 DIAGNOSIS — Z13.1 SCREENING FOR DIABETES MELLITUS: ICD-10-CM

## 2024-11-11 PROBLEM — G50.0 TRIGEMINAL NEURALGIA: Status: ACTIVE | Noted: 2024-05-29

## 2024-11-11 LAB
EST. AVERAGE GLUCOSE BLD GHB EST-MCNC: 108 MG/DL
HBA1C MFR BLD: 5.4 % (ref 0–5.6)

## 2024-11-11 PROCEDURE — 36415 COLL VENOUS BLD VENIPUNCTURE: CPT | Performed by: FAMILY MEDICINE

## 2024-11-11 PROCEDURE — 99396 PREV VISIT EST AGE 40-64: CPT | Mod: 25 | Performed by: FAMILY MEDICINE

## 2024-11-11 PROCEDURE — 91320 SARSCV2 VAC 30MCG TRS-SUC IM: CPT | Performed by: FAMILY MEDICINE

## 2024-11-11 PROCEDURE — 90656 IIV3 VACC NO PRSV 0.5 ML IM: CPT | Performed by: FAMILY MEDICINE

## 2024-11-11 PROCEDURE — 83036 HEMOGLOBIN GLYCOSYLATED A1C: CPT | Performed by: FAMILY MEDICINE

## 2024-11-11 PROCEDURE — 90480 ADMN SARSCOV2 VAC 1/ONLY CMP: CPT | Performed by: FAMILY MEDICINE

## 2024-11-11 PROCEDURE — 90471 IMMUNIZATION ADMIN: CPT | Performed by: FAMILY MEDICINE

## 2024-11-11 NOTE — PROGRESS NOTES
"Preventive Care Visit  [facility]  Moe Riggs DO, Family Medicine  Nov 11, 2024      Assessment & Plan     Routine general medical examination at a health care facility  - PRIMARY CARE FOLLOW-UP SCHEDULING    Encounter for vaccination  - INFLUENZA VACCINE,SPLIT VIRUS,TRIVALENT,PF(FLUZONE)  - COVID-19 12+ (PFIZER)    Screening for diabetes mellitus  Abnormal glucose  -Had A1c checked 6 months ago with gyn, was 5.9. Did not have much of discussion with gyn about results.  -Discussed rechecking today for monitoring. If still in prediabetes range recommended diabetic education for dietary and lifestyle changes to prevent progression to diabetes. Pt amendable to plan.  - Hemoglobin A1c            BMI  Estimated body mass index is 28.26 kg/m  as calculated from the following:    Height as of this encounter: 1.76 m (5' 9.29\").    Weight as of this encounter: 87.5 kg (193 lb).   Weight management plan: Discussed healthy diet and exercise guidelines    Counseling  Appropriate preventive services were addressed with this patient via screening, questionnaire, or discussion as appropriate for fall prevention, nutrition, physical activity, Tobacco-use cessation, social engagement, weight loss and cognition.  Checklist reviewing preventive services available has been given to the patient.  Reviewed patient's diet, addressing concerns and/or questions.   She is at risk for lack of exercise and has been provided with information to increase physical activity for the benefit of her well-being.   The patient's PHQ-9 score is consistent with mild depression. She was provided with information regarding depression.           Lalo Bourgeois is a 39 year old, presenting for the following:  No chief complaint on file.        11/11/2024     8:15 AM   Additional Questions   Roomed by ALBER DAILEY    Depression -has weaned off Cymbalta. Mood is manageable.  Periods -2-3 months ago had 37 day cycle. Now " back to normal.    Health Care Directive  Patient does not have a Health Care Directive: Discussed advance care planning with patient; information given to patient to review.      11/6/2024   General Health   How would you rate your overall physical health? (!) FAIR   Feel stress (tense, anxious, or unable to sleep) To some extent      (!) STRESS CONCERN      11/6/2024   Nutrition   Three or more servings of calcium each day? (!) NO   Diet: Regular (no restrictions)   How many servings of fruit and vegetables per day? (!) 2-3   How many sweetened beverages each day? 0-1            11/6/2024   Exercise   Days per week of moderate/strenous exercise 3 days   Average minutes spent exercising at this level 30 min            11/6/2024   Social Factors   Frequency of gathering with friends or relatives Once a week   Worry food won't last until get money to buy more No   Food not last or not have enough money for food? No   Do you have housing? (Housing is defined as stable permanent housing and does not include staying ouside in a car, in a tent, in an abandoned building, in an overnight shelter, or couch-surfing.) Yes   Are you worried about losing your housing? No   Lack of transportation? No   Unable to get utilities (heat,electricity)? No            11/6/2024   Dental   Dentist two times every year? Yes            11/6/2024   TB Screening   Were you born outside of the [place]? No          Today's PHQ-9 Score:       11/6/2024     9:27 PM   PHQ-9 SCORE   PHQ-9 Total Score MyChart 5 (Mild depression)   PHQ-9 Total Score 5        Patient-reported         11/6/2024   Substance Use   Alcohol more than 3/day or more than 7/wk Not Applicable   Do you use any other substances recreationally? No        Social History     Tobacco Use    Smoking status: Never    Smokeless tobacco: Never   Vaping Use    Vaping status: Never Used   Substance Use Topics    Alcohol use: No    Drug use: No             11/6/2024   Breast Cancer Screening  "  Family history of breast, colon, or ovarian cancer? No / Unknown                 11/6/2024   STI Screening   New sexual partner(s) since last STI/HIV test? No        History of abnormal Pap smear: No - age 30- 64 PAP with HPV every 5 years recommended        Latest Ref Rng & Units 5/24/2023     9:57 AM   PAP / HPV   PAP  Negative for Intraepithelial Lesion or Malignancy (NILM)    HPV 16 DNA Negative Negative    HPV 18 DNA Negative Negative    Other HR HPV Negative Negative      ASCVD Risk   The 10-year ASCVD risk score (Mary Grace BENDER, et al., 2019) is: 0.7%    Values used to calculate the score:      Age: 39 years      Sex: Female      Is Non- : No      Diabetic: No      Tobacco smoker: No      Systolic Blood Pressure: 110 mmHg      Is BP treated: No      HDL Cholesterol: 44 mg/dL      Total Cholesterol: 195 mg/dL        11/6/2024   Contraception/Family Planning   Questions about contraception or family planning No           Reviewed and updated as needed this visit by Provider   Tobacco   Meds    Surg Hx  Fam Hx  Soc Hx Sexual Activity                   Objective    Exam  /74 (BP Location: Right arm, Patient Position: Chair, Cuff Size: Adult Regular)   Pulse 68   Temp 97.1  F (36.2  C) (Temporal)   Resp 15   Ht 1.76 m (5' 9.29\")   Wt 87.5 kg (193 lb)   SpO2 100%   BMI 28.26 kg/m     Estimated body mass index is 28.26 kg/m  as calculated from the following:    Height as of this encounter: 1.76 m (5' 9.29\").    Weight as of this encounter: 87.5 kg (193 lb).    Physical Exam  GENERAL: alert and no distress  EYES: Eyes grossly normal to inspection, PERRL and conjunctivae and sclerae normal  HENT: nose and mouth without ulcers or lesions  NECK: no adenopathy, no asymmetry, masses, or scars  RESP: lungs clear to auscultation - no rales, rhonchi or wheezes  CV: regular rate and rhythm, normal S1 S2, no S3 or S4, no murmur, click or rub, no peripheral edema  ABDOMEN: soft, " nontender, no hepatosplenomegaly, no masses and bowel sounds normal  MS: no gross musculoskeletal defects noted, no edema  SKIN: no suspicious lesions or rashes  NEURO: Normal strength and tone, mentation intact and speech normal  PSYCH: mentation appears normal, affect normal/bright        Signed Electronically by: Moe Riggs DO

## 2024-11-11 NOTE — PATIENT INSTRUCTIONS
Patient Education   Preventive Care Advice   This is general advice given by our system to help you stay healthy. However, your care team may have specific advice just for you. Please talk to your care team about your preventive care needs.  Nutrition  Eat 5 or more servings of fruits and vegetables each day.  Try wheat bread, brown rice and whole grain pasta (instead of white bread, rice, and pasta).  Get enough calcium and vitamin D. Check the label on foods and aim for 100% of the RDA (recommended daily allowance).  Lifestyle  Exercise at least 150 minutes each week  (30 minutes a day, 5 days a week).  Do muscle strengthening activities 2 days a week. These help control your weight and prevent disease.  No smoking.  Wear sunscreen to prevent skin cancer.  Have a dental exam and cleaning every 6 months.  Yearly exams  See your health care team every year to talk about:  Any changes in your health.  Any medicines your care team has prescribed.  Preventive care, family planning, and ways to prevent chronic diseases.  Shots (vaccines)   HPV shots (up to age 26), if you've never had them before.  Hepatitis B shots (up to age 59), if you've never had them before.  COVID-19 shot: Get this shot when it's due.  Flu shot: Get a flu shot every year.  Tetanus shot: Get a tetanus shot every 10 years.  Pneumococcal, hepatitis A, and RSV shots: Ask your care team if you need these based on your risk.  Shingles shot (for age 50 and up)  General health tests  Diabetes screening:  Starting at age 35, Get screened for diabetes at least every 3 years.  If you are younger than age 35, ask your care team if you should be screened for diabetes.  Cholesterol test: At age 39, start having a cholesterol test every 5 years, or more often if advised.  Bone density scan (DEXA): At age 50, ask your care team if you should have this scan for osteoporosis (brittle bones).  Hepatitis C: Get tested at least once in your life.  STIs (sexually  transmitted infections)  Before age 24: Ask your care team if you should be screened for STIs.  After age 24: Get screened for STIs if you're at risk. You are at risk for STIs (including HIV) if:  You are sexually active with more than one person.  You don't use condoms every time.  You or a partner was diagnosed with a sexually transmitted infection.  If you are at risk for HIV, ask about PrEP medicine to prevent HIV.  Get tested for HIV at least once in your life, whether you are at risk for HIV or not.  Cancer screening tests  Cervical cancer screening: If you have a cervix, begin getting regular cervical cancer screening tests starting at age 21.  Breast cancer scan (mammogram): If you've ever had breasts, begin having regular mammograms starting at age 40. This is a scan to check for breast cancer.  Colon cancer screening: It is important to start screening for colon cancer at age 45.  Have a colonoscopy test every 10 years (or more often if you're at risk) Or, ask your provider about stool tests like a FIT test every year or Cologuard test every 3 years.  To learn more about your testing options, visit:   .  For help making a decision, visit:   https://bit.ly/bb11811.  Prostate cancer screening test: If you have a prostate, ask your care team if a prostate cancer screening test (PSA) at age 55 is right for you.  Lung cancer screening: If you are a current or former smoker ages 50 to 80, ask your care team if ongoing lung cancer screenings are right for you.  For informational purposes only. Not to replace the advice of your health care provider. Copyright   2023 [facility]. All rights reserved. Clinically reviewed by the [facility]. [employer] 441368 - REV 01/24.  Learning About Depression Screening  What is depression screening?  Depression screening is a way to see if you have depression symptoms. It may be done by a doctor or counselor. It's often part of a routine  "checkup. That's because your mental health is just as important as your physical health.  Depression is a mental health condition that affects how you feel, think, and act. You may:  Have less energy.  Lose interest in your daily activities.  Feel sad and grouchy for a long time.  Depression is very common. It affects people of all ages.  Many things can lead to depression. Some people become depressed after they have a stroke or find out they have a major illness like cancer or heart disease. The death of a loved one or a breakup may lead to depression. It can run in families. Most experts believe that a combination of inherited genes and stressful life events can cause it.  What happens during screening?  You may be asked to fill out a form about your depression symptoms. You and the doctor will discuss your answers. The doctor may ask you more questions to learn more about how you think, act, and feel.  What happens after screening?  If you have symptoms of depression, your doctor will talk to you about your options.  Doctors usually treat depression with medicines or counseling. Often, combining the two works best. Many people don't get help because they think that they'll get over the depression on their own. But people with depression may not get better unless they get treatment.  The cause of depression is not well understood. There may be many factors involved. But if you have depression, it's not your fault.  A serious symptom of depression is thinking about death or suicide. If you or someone you care about talks about this or about feeling hopeless, get help right away.  It's important to know that depression can be treated. Medicine, counseling, and self-care may help.  Where can you learn more?  Go to https://www.[employer].net/patiented  Enter T185 in the search box to learn more about \"Learning About Depression Screening.\"  Current as of: June 24, 2023  Content Version: 14.2 2024 [employer].   Care instructions adapted under license by your healthcare professional. If you have questions about a medical condition or this instruction, always ask your healthcare professional. [employer] disclaims any warranty or liability for your use of this information.

## 2024-11-25 ENCOUNTER — MYC REFILL (OUTPATIENT)
Dept: NEUROSURGERY | Facility: CLINIC | Age: 39
End: 2024-11-25
Payer: COMMERCIAL

## 2024-11-25 DIAGNOSIS — G50.0 TRIGEMINAL NEURALGIA: ICD-10-CM

## 2024-11-25 RX ORDER — OXCARBAZEPINE 150 MG/1
150 TABLET, FILM COATED ORAL 2 TIMES DAILY
Qty: 60 TABLET | Refills: 0 | Status: SHIPPED | OUTPATIENT
Start: 2024-11-25

## 2025-01-02 ENCOUNTER — MYC MEDICAL ADVICE (OUTPATIENT)
Dept: NEUROSURGERY | Facility: CLINIC | Age: 40
End: 2025-01-02
Payer: COMMERCIAL

## 2025-01-08 ENCOUNTER — TELEPHONE (OUTPATIENT)
Dept: NEUROSURGERY | Facility: CLINIC | Age: 40
End: 2025-01-08
Payer: COMMERCIAL

## 2025-01-08 DIAGNOSIS — G50.0 TRIGEMINAL NEURALGIA: Primary | ICD-10-CM

## 2025-01-08 NOTE — TELEPHONE ENCOUNTER
Christelle returned phone call.    CMP and CBC orders entered, will have at PCP office anytime in the next month.    If values within normal limits will refill medication with 90 day supply Oxcarbazepine 150 twice daily.    Voices understanding.

## 2025-01-08 NOTE — TELEPHONE ENCOUNTER
Left patient message to please return call to Charline RN to verify current medications for facial pain , enter lab order at patient convenience, CMP and CBC and work with 90 day supply medication.    Thank you

## 2025-01-18 ENCOUNTER — LAB (OUTPATIENT)
Dept: LAB | Facility: CLINIC | Age: 40
End: 2025-01-18
Payer: COMMERCIAL

## 2025-01-18 DIAGNOSIS — G50.0 TRIGEMINAL NEURALGIA: ICD-10-CM

## 2025-01-18 LAB
ALBUMIN SERPL BCG-MCNC: 4.6 G/DL (ref 3.5–5.2)
ALP SERPL-CCNC: 57 U/L (ref 40–150)
ALT SERPL W P-5'-P-CCNC: 20 U/L (ref 0–50)
ANION GAP SERPL CALCULATED.3IONS-SCNC: 11 MMOL/L (ref 7–15)
AST SERPL W P-5'-P-CCNC: 22 U/L (ref 0–45)
BASOPHILS # BLD AUTO: 0 10E3/UL (ref 0–0.2)
BASOPHILS NFR BLD AUTO: 1 %
BILIRUB SERPL-MCNC: 0.7 MG/DL
BUN SERPL-MCNC: 14.8 MG/DL (ref 6–20)
CALCIUM SERPL-MCNC: 9.8 MG/DL (ref 8.8–10.4)
CHLORIDE SERPL-SCNC: 102 MMOL/L (ref 98–107)
CREAT SERPL-MCNC: 0.84 MG/DL (ref 0.51–0.95)
EGFRCR SERPLBLD CKD-EPI 2021: 89 ML/MIN/1.73M2
EOSINOPHIL # BLD AUTO: 0.1 10E3/UL (ref 0–0.7)
EOSINOPHIL NFR BLD AUTO: 1 %
ERYTHROCYTE [DISTWIDTH] IN BLOOD BY AUTOMATED COUNT: 12.4 % (ref 10–15)
GLUCOSE SERPL-MCNC: 88 MG/DL (ref 70–99)
HCO3 SERPL-SCNC: 24 MMOL/L (ref 22–29)
HCT VFR BLD AUTO: 38.8 % (ref 35–47)
HGB BLD-MCNC: 12.6 G/DL (ref 11.7–15.7)
IMM GRANULOCYTES # BLD: 0 10E3/UL
IMM GRANULOCYTES NFR BLD: 0 %
LYMPHOCYTES # BLD AUTO: 1 10E3/UL (ref 0.8–5.3)
LYMPHOCYTES NFR BLD AUTO: 15 %
MCH RBC QN AUTO: 28.2 PG (ref 26.5–33)
MCHC RBC AUTO-ENTMCNC: 32.5 G/DL (ref 31.5–36.5)
MCV RBC AUTO: 87 FL (ref 78–100)
MONOCYTES # BLD AUTO: 0.8 10E3/UL (ref 0–1.3)
MONOCYTES NFR BLD AUTO: 12 %
NEUTROPHILS # BLD AUTO: 4.5 10E3/UL (ref 1.6–8.3)
NEUTROPHILS NFR BLD AUTO: 71 %
PLATELET # BLD AUTO: 362 10E3/UL (ref 150–450)
POTASSIUM SERPL-SCNC: 4.4 MMOL/L (ref 3.4–5.3)
PROT SERPL-MCNC: 8 G/DL (ref 6.4–8.3)
RBC # BLD AUTO: 4.47 10E6/UL (ref 3.8–5.2)
SODIUM SERPL-SCNC: 137 MMOL/L (ref 135–145)
WBC # BLD AUTO: 6.4 10E3/UL (ref 4–11)

## 2025-01-18 PROCEDURE — 85025 COMPLETE CBC W/AUTO DIFF WBC: CPT

## 2025-01-18 PROCEDURE — 36415 COLL VENOUS BLD VENIPUNCTURE: CPT

## 2025-01-18 PROCEDURE — 80053 COMPREHEN METABOLIC PANEL: CPT

## 2025-01-27 ENCOUNTER — MYC MEDICAL ADVICE (OUTPATIENT)
Dept: NEUROSURGERY | Facility: CLINIC | Age: 40
End: 2025-01-27
Payer: COMMERCIAL

## 2025-01-27 DIAGNOSIS — G50.0 TRIGEMINAL NEURALGIA: ICD-10-CM

## 2025-01-27 RX ORDER — OXCARBAZEPINE 150 MG/1
150 TABLET, FILM COATED ORAL 2 TIMES DAILY
Qty: 180 TABLET | Refills: 1 | Status: SHIPPED | OUTPATIENT
Start: 2025-01-27

## 2025-06-09 ENCOUNTER — PATIENT OUTREACH (OUTPATIENT)
Dept: CARE COORDINATION | Facility: CLINIC | Age: 40
End: 2025-06-09
Payer: COMMERCIAL

## 2025-06-16 ENCOUNTER — ANCILLARY PROCEDURE (OUTPATIENT)
Dept: BONE DENSITY | Facility: CLINIC | Age: 40
End: 2025-06-16
Attending: OBSTETRICS & GYNECOLOGY
Payer: COMMERCIAL

## 2025-06-16 DIAGNOSIS — Z82.62 FAMILY HISTORY OF OSTEOPOROSIS: ICD-10-CM

## 2025-06-16 PROCEDURE — 77091 TBS TECHL CALCULATION ONLY: CPT | Performed by: PHYSICIAN ASSISTANT

## 2025-06-16 PROCEDURE — 77080 DXA BONE DENSITY AXIAL: CPT | Mod: TC | Performed by: PHYSICIAN ASSISTANT

## 2025-08-28 ENCOUNTER — VIRTUAL VISIT (OUTPATIENT)
Dept: NEUROSURGERY | Facility: CLINIC | Age: 40
End: 2025-08-28
Payer: COMMERCIAL

## 2025-08-28 VITALS — HEIGHT: 69 IN | BODY MASS INDEX: 22.22 KG/M2 | WEIGHT: 150 LBS

## 2025-08-28 DIAGNOSIS — G50.0 TRIGEMINAL NEURALGIA: Primary | ICD-10-CM

## 2025-08-28 RX ORDER — OXCARBAZEPINE 150 MG/1
150 TABLET, FILM COATED ORAL 2 TIMES DAILY
Qty: 180 TABLET | Refills: 0 | Status: SHIPPED | OUTPATIENT
Start: 2025-08-28

## 2025-08-28 ASSESSMENT — PAIN SCALES - GENERAL: PAINLEVEL_OUTOF10: NO PAIN (0)

## 2025-08-30 ENCOUNTER — HEALTH MAINTENANCE LETTER (OUTPATIENT)
Age: 40
End: 2025-08-30